# Patient Record
Sex: MALE | Race: WHITE
[De-identification: names, ages, dates, MRNs, and addresses within clinical notes are randomized per-mention and may not be internally consistent; named-entity substitution may affect disease eponyms.]

---

## 2019-06-29 ENCOUNTER — HOSPITAL ENCOUNTER (EMERGENCY)
Dept: HOSPITAL 95 - ER | Age: 44
Discharge: LEFT BEFORE DISCHARGE | End: 2019-06-29
Payer: COMMERCIAL

## 2019-06-29 VITALS — WEIGHT: 250 LBS | BODY MASS INDEX: 33.86 KG/M2 | HEIGHT: 72 IN

## 2019-06-29 DIAGNOSIS — R44.0: Primary | ICD-10-CM

## 2019-06-29 DIAGNOSIS — Z53.20: ICD-10-CM

## 2019-06-29 LAB
ALBUMIN SERPL BCP-MCNC: 3.9 G/DL (ref 3.4–5)
ALBUMIN/GLOB SERPL: 1.1 {RATIO} (ref 0.8–1.8)
ALT SERPL W P-5'-P-CCNC: 42 U/L (ref 12–78)
ANION GAP SERPL CALCULATED.4IONS-SCNC: 9 MMOL/L (ref 6–16)
APAP SERPL-MCNC: <2 UG/ML (ref 10–30)
AST SERPL W P-5'-P-CCNC: 28 U/L (ref 12–37)
BASOPHILS # BLD AUTO: 0.03 K/MM3 (ref 0–0.23)
BASOPHILS NFR BLD AUTO: 0 % (ref 0–2)
BILIRUB SERPL-MCNC: 0.3 MG/DL (ref 0.1–1)
BUN SERPL-MCNC: 18 MG/DL (ref 8–24)
CALCIUM SERPL-MCNC: 8.6 MG/DL (ref 8.5–10.1)
CHLORIDE SERPL-SCNC: 109 MMOL/L (ref 98–108)
CO2 SERPL-SCNC: 26 MMOL/L (ref 21–32)
CREAT SERPL-MCNC: 0.93 MG/DL (ref 0.6–1.2)
DEPRECATED RDW RBC AUTO: 45.1 FL (ref 35.1–46.3)
EOSINOPHIL # BLD AUTO: 0.2 K/MM3 (ref 0–0.68)
EOSINOPHIL NFR BLD AUTO: 2 % (ref 0–6)
ERYTHROCYTE [DISTWIDTH] IN BLOOD BY AUTOMATED COUNT: 13.6 % (ref 11.7–14.2)
ETHANOL SERPL-MCNC: <3 MG/DL
GLOBULIN SER CALC-MCNC: 3.5 G/DL (ref 2.2–4)
GLUCOSE SERPL-MCNC: 114 MG/DL (ref 70–99)
HCT VFR BLD AUTO: 43.9 % (ref 37–53)
HGB BLD-MCNC: 14.7 G/DL (ref 13.5–17.5)
IMM GRANULOCYTES # BLD AUTO: 0.04 K/MM3 (ref 0–0.1)
IMM GRANULOCYTES NFR BLD AUTO: 0 % (ref 0–1)
LYMPHOCYTES # BLD AUTO: 3.35 K/MM3 (ref 0.84–5.2)
LYMPHOCYTES NFR BLD AUTO: 32 % (ref 21–46)
MCHC RBC AUTO-ENTMCNC: 33.5 G/DL (ref 31.5–36.5)
MCV RBC AUTO: 90 FL (ref 80–100)
MONOCYTES # BLD AUTO: 0.81 K/MM3 (ref 0.16–1.47)
MONOCYTES NFR BLD AUTO: 8 % (ref 4–13)
NEUTROPHILS # BLD AUTO: 5.96 K/MM3 (ref 1.96–9.15)
NEUTROPHILS NFR BLD AUTO: 57 % (ref 41–73)
NRBC # BLD AUTO: 0 K/MM3 (ref 0–0.02)
NRBC BLD AUTO-RTO: 0 /100 WBC (ref 0–0.2)
PLATELET # BLD AUTO: 273 K/MM3 (ref 150–400)
POTASSIUM SERPL-SCNC: 3.6 MMOL/L (ref 3.5–5.5)
PROT SERPL-MCNC: 7.4 G/DL (ref 6.4–8.2)
SALICYLATES SERPL-MCNC: <1.7 MG/DL (ref 2.8–20)
SODIUM SERPL-SCNC: 144 MMOL/L (ref 136–145)
TSH SERPL DL<=0.005 MIU/L-ACNC: 3.83 UIU/ML (ref 0.36–4.8)

## 2019-06-29 PROCEDURE — G0480 DRUG TEST DEF 1-7 CLASSES: HCPCS

## 2020-03-07 ENCOUNTER — HOSPITAL ENCOUNTER (EMERGENCY)
Dept: HOSPITAL 95 - ER | Age: 45
Discharge: HOME | End: 2020-03-07
Payer: COMMERCIAL

## 2020-03-07 VITALS — BODY MASS INDEX: 35.89 KG/M2 | WEIGHT: 265 LBS | HEIGHT: 72 IN

## 2020-03-07 DIAGNOSIS — M79.674: Primary | ICD-10-CM

## 2020-03-07 DIAGNOSIS — Z88.8: ICD-10-CM

## 2020-03-07 DIAGNOSIS — Z79.899: ICD-10-CM

## 2020-03-07 DIAGNOSIS — F25.9: ICD-10-CM

## 2020-03-07 DIAGNOSIS — E78.5: ICD-10-CM

## 2020-03-07 DIAGNOSIS — Z87.891: ICD-10-CM

## 2020-05-31 ENCOUNTER — HOSPITAL ENCOUNTER (OUTPATIENT)
Dept: HOSPITAL 95 - ER | Age: 45
Setting detail: OBSERVATION
LOS: 1 days | Discharge: HOME | End: 2020-06-01
Attending: HOSPITALIST | Admitting: HOSPITALIST
Payer: COMMERCIAL

## 2020-05-31 VITALS — BODY MASS INDEX: 35.68 KG/M2 | HEIGHT: 71 IN | WEIGHT: 254.85 LBS

## 2020-05-31 DIAGNOSIS — I21.4: Primary | ICD-10-CM

## 2020-05-31 DIAGNOSIS — Z79.899: ICD-10-CM

## 2020-05-31 DIAGNOSIS — E66.9: ICD-10-CM

## 2020-05-31 DIAGNOSIS — F25.9: ICD-10-CM

## 2020-05-31 DIAGNOSIS — Z88.8: ICD-10-CM

## 2020-05-31 DIAGNOSIS — E78.5: ICD-10-CM

## 2020-05-31 LAB
ALBUMIN SERPL BCP-MCNC: 3.7 G/DL (ref 3.4–5)
ALBUMIN/GLOB SERPL: 1 {RATIO} (ref 0.8–1.8)
ALT SERPL W P-5'-P-CCNC: 39 U/L (ref 12–78)
ANION GAP SERPL CALCULATED.4IONS-SCNC: 7 MMOL/L (ref 6–16)
AST SERPL W P-5'-P-CCNC: 30 U/L (ref 12–37)
BASOPHILS # BLD AUTO: 0.04 K/MM3 (ref 0–0.23)
BASOPHILS NFR BLD AUTO: 1 % (ref 0–2)
BILIRUB SERPL-MCNC: 0.6 MG/DL (ref 0.1–1)
BUN SERPL-MCNC: 9 MG/DL (ref 8–24)
CALCIUM SERPL-MCNC: 8.1 MG/DL (ref 8.5–10.1)
CHLORIDE SERPL-SCNC: 110 MMOL/L (ref 98–108)
CHOLEST SERPL-MCNC: 122 MG/DL (ref 50–200)
CHOLEST/HDLC SERPL: 3 {RATIO}
CO2 SERPL-SCNC: 24 MMOL/L (ref 21–32)
CREAT SERPL-MCNC: 0.99 MG/DL (ref 0.6–1.2)
DEPRECATED RDW RBC AUTO: 45.1 FL (ref 35.1–46.3)
EOSINOPHIL # BLD AUTO: 0.19 K/MM3 (ref 0–0.68)
EOSINOPHIL NFR BLD AUTO: 2 % (ref 0–6)
ERYTHROCYTE [DISTWIDTH] IN BLOOD BY AUTOMATED COUNT: 13.6 % (ref 11.7–14.2)
GLOBULIN SER CALC-MCNC: 3.6 G/DL (ref 2.2–4)
GLUCOSE SERPL-MCNC: 99 MG/DL (ref 70–99)
HCT VFR BLD AUTO: 45.5 % (ref 37–53)
HDLC SERPL-MCNC: 41 MG/DL (ref 39–?)
HGB BLD-MCNC: 14.8 G/DL (ref 13.5–17.5)
IMM GRANULOCYTES # BLD AUTO: 0.02 K/MM3 (ref 0–0.1)
IMM GRANULOCYTES NFR BLD AUTO: 0 % (ref 0–1)
LDLC SERPL CALC-MCNC: 54 MG/DL (ref 0–110)
LDLC/HDLC SERPL: 1.3 {RATIO}
LYMPHOCYTES # BLD AUTO: 2.54 K/MM3 (ref 0.84–5.2)
LYMPHOCYTES NFR BLD AUTO: 30 % (ref 21–46)
MCHC RBC AUTO-ENTMCNC: 32.5 G/DL (ref 31.5–36.5)
MCV RBC AUTO: 91 FL (ref 80–100)
MONOCYTES # BLD AUTO: 0.61 K/MM3 (ref 0.16–1.47)
MONOCYTES NFR BLD AUTO: 7 % (ref 4–13)
NEUTROPHILS # BLD AUTO: 5.18 K/MM3 (ref 1.96–9.15)
NEUTROPHILS NFR BLD AUTO: 60 % (ref 41–73)
NRBC # BLD AUTO: 0 K/MM3 (ref 0–0.02)
NRBC BLD AUTO-RTO: 0 /100 WBC (ref 0–0.2)
PLATELET # BLD AUTO: 291 K/MM3 (ref 150–400)
POTASSIUM SERPL-SCNC: 4.4 MMOL/L (ref 3.5–5.5)
PROT SERPL-MCNC: 7.3 G/DL (ref 6.4–8.2)
SODIUM SERPL-SCNC: 141 MMOL/L (ref 136–145)
TRIGL SERPL-MCNC: 134 MG/DL (ref 30–160)
TROPONIN I SERPL-MCNC: 0.16 NG/ML (ref 0–0.04)
VLDLC SERPL CALC-MCNC: 26 MG/DL (ref 6–32)

## 2020-05-31 PROCEDURE — C1894 INTRO/SHEATH, NON-LASER: HCPCS

## 2020-05-31 PROCEDURE — C8923 2D TTE W OR W/O FOL W/CON,CO: HCPCS

## 2020-05-31 PROCEDURE — C1769 GUIDE WIRE: HCPCS

## 2020-05-31 NOTE — NUR
PT AOX4 AND COOPERATIVE OF CARE. PT IS INDEPENDENT IN ROOM. PT DENIES ANY
PAIN,CHEST PRESSURE OR SOB AT THIS TIME. HEPRIN DRIP CONTINUES AND CONSULT HAS
BEEN CALLED INTO CARDIOLIGIST. NO DISTRESS NOTED PT CALLS APPROPRIATELY. PT IS
INDEPENDENT IN ROOM. WILL CONTINUE TO MONITOR.

## 2020-06-01 LAB
ALBUMIN SERPL BCP-MCNC: 3.8 G/DL (ref 3.4–5)
ALBUMIN/GLOB SERPL: 1.1 {RATIO} (ref 0.8–1.8)
ALT SERPL W P-5'-P-CCNC: 38 U/L (ref 12–78)
ANION GAP SERPL CALCULATED.4IONS-SCNC: 9 MMOL/L (ref 6–16)
AST SERPL W P-5'-P-CCNC: 22 U/L (ref 12–37)
BASOPHILS # BLD AUTO: 0.05 K/MM3 (ref 0–0.23)
BASOPHILS NFR BLD AUTO: 1 % (ref 0–2)
BILIRUB SERPL-MCNC: 0.5 MG/DL (ref 0.1–1)
BUN SERPL-MCNC: 10 MG/DL (ref 8–24)
CALCIUM SERPL-MCNC: 8.4 MG/DL (ref 8.5–10.1)
CHLORIDE SERPL-SCNC: 107 MMOL/L (ref 98–108)
CO2 SERPL-SCNC: 26 MMOL/L (ref 21–32)
CREAT SERPL-MCNC: 0.93 MG/DL (ref 0.6–1.2)
DEPRECATED RDW RBC AUTO: 46.2 FL (ref 35.1–46.3)
EOSINOPHIL # BLD AUTO: 0.2 K/MM3 (ref 0–0.68)
EOSINOPHIL NFR BLD AUTO: 2 % (ref 0–6)
ERYTHROCYTE [DISTWIDTH] IN BLOOD BY AUTOMATED COUNT: 13.8 % (ref 11.7–14.2)
GLOBULIN SER CALC-MCNC: 3.6 G/DL (ref 2.2–4)
GLUCOSE SERPL-MCNC: 86 MG/DL (ref 70–99)
HCT VFR BLD AUTO: 47 % (ref 37–53)
HGB BLD-MCNC: 15 G/DL (ref 13.5–17.5)
IMM GRANULOCYTES # BLD AUTO: 0.02 K/MM3 (ref 0–0.1)
IMM GRANULOCYTES NFR BLD AUTO: 0 % (ref 0–1)
LYMPHOCYTES # BLD AUTO: 3.84 K/MM3 (ref 0.84–5.2)
LYMPHOCYTES NFR BLD AUTO: 39 % (ref 21–46)
MAGNESIUM SERPL-MCNC: 2.5 MG/DL (ref 1.6–2.4)
MCHC RBC AUTO-ENTMCNC: 31.9 G/DL (ref 31.5–36.5)
MCV RBC AUTO: 90 FL (ref 80–100)
MONOCYTES # BLD AUTO: 0.68 K/MM3 (ref 0.16–1.47)
MONOCYTES NFR BLD AUTO: 7 % (ref 4–13)
NEUTROPHILS # BLD AUTO: 5.07 K/MM3 (ref 1.96–9.15)
NEUTROPHILS NFR BLD AUTO: 52 % (ref 41–73)
NRBC # BLD AUTO: 0 K/MM3 (ref 0–0.02)
NRBC BLD AUTO-RTO: 0 /100 WBC (ref 0–0.2)
PLATELET # BLD AUTO: 308 K/MM3 (ref 150–400)
POTASSIUM SERPL-SCNC: 3.5 MMOL/L (ref 3.5–5.5)
PROT SERPL-MCNC: 7.4 G/DL (ref 6.4–8.2)
SODIUM SERPL-SCNC: 142 MMOL/L (ref 136–145)
TROPONIN I SERPL-MCNC: 0.17 NG/ML (ref 0–0.04)

## 2020-06-01 NOTE — NUR
2ml AIR REMOVED FROM RIGHT TR BAND, NO ACTIVE BLEEDING, WILL CONTINUE TO
MONITOR. CAP REFILL <3, RADIAL PULSE PALBABLE. CONTINUOUS OXIMITER READING
97%.

## 2020-06-01 NOTE — NUR
TR BAND REMAINS IN PLACE ON RIGHT WRIST. CAP REFILL <3, POSITIVE COLOR
CHANGES, RADIAL PULSE PALPABLE, NO BLEEDING AT THIS TIME.

## 2020-06-01 NOTE — NUR
PT TRANSPORTED TO THE [facility] FOR SCHEDULED ANGIO. HE HAS BEEN NPO SINCE
LAST NIGHT. HE IS A/O X 4 AND HAS NO COMPLAINTS THIS MORNING.
REPORT WAS CALLED TO PCU NURSE AS HE WILL BE TRANSFERRING TO PCU
AFTER HIS PROCEDURE. UPDATES HAVE BEEN GIVEN TO HIS MOM. BELONGINGS WILL BE
SENT TO HIS NEW ROOM.

## 2020-06-01 NOTE — NUR
RIGHT TR BAND REMOVED, TEGADERM PLACED, NO ACTIVE BLEEDING. DISCHARGE
INSTRUCTIONS REVIEWED WITH PT WITH CLEAR UNDERSTANDING.

## 2020-06-01 NOTE — NUR
ASSUMED CARE IN PCU ROOM 7, REPORT FROM RUBIA NETTLES IN [facility]ART [facility]. TR BAND IN
PLACE TO RIGHT WRIST. RADIAL PULSE PALPABLE, CAP REFILL <3, DENIES NUMBNESS,
ARM BOARD IN PLACE, NO BLEEDING. A/A/OX4, VSS, WILL CONTINUE TO MONITOR.

## 2020-06-01 NOTE — NUR
TOTAL AIR OF 12ML REMOVED OVER THE LAST HOUR. RIGHT TR BAND IN PLACE, NO
ACTIVE BLEEDING AT THIS TIME. RADIAL PULSE PALPABLE, CAP REFILL <3, POSITIVE
CAP REFILL, DENIES NUMBNESS TO FINGERS. WILL CONTINUE TO MONITOR.

## 2022-05-30 ENCOUNTER — HOSPITAL ENCOUNTER (EMERGENCY)
Dept: HOSPITAL 95 - ER | Age: 47
Discharge: HOME | End: 2022-05-30
Payer: COMMERCIAL

## 2022-05-30 VITALS — HEIGHT: 72 IN | BODY MASS INDEX: 27.09 KG/M2 | WEIGHT: 200 LBS

## 2022-05-30 DIAGNOSIS — Z87.891: ICD-10-CM

## 2022-05-30 DIAGNOSIS — Z79.82: ICD-10-CM

## 2022-05-30 DIAGNOSIS — Z79.899: ICD-10-CM

## 2022-05-30 DIAGNOSIS — Z88.8: ICD-10-CM

## 2022-05-30 DIAGNOSIS — G47.00: Primary | ICD-10-CM

## 2022-05-30 DIAGNOSIS — E78.5: ICD-10-CM

## 2022-05-30 PROCEDURE — A9270 NON-COVERED ITEM OR SERVICE: HCPCS

## 2022-06-27 ENCOUNTER — HOSPITAL ENCOUNTER (EMERGENCY)
Dept: HOSPITAL 95 - ER | Age: 47
Discharge: HOME | End: 2022-06-27
Payer: COMMERCIAL

## 2022-06-27 VITALS — HEIGHT: 72 IN | BODY MASS INDEX: 27.09 KG/M2 | WEIGHT: 200 LBS

## 2022-06-27 DIAGNOSIS — M54.50: Primary | ICD-10-CM

## 2022-06-27 DIAGNOSIS — E78.5: ICD-10-CM

## 2022-06-27 DIAGNOSIS — F25.9: ICD-10-CM

## 2022-06-27 DIAGNOSIS — Z79.899: ICD-10-CM

## 2022-06-27 DIAGNOSIS — Z88.8: ICD-10-CM

## 2022-06-27 PROCEDURE — A9270 NON-COVERED ITEM OR SERVICE: HCPCS

## 2022-07-09 ENCOUNTER — HOSPITAL ENCOUNTER (EMERGENCY)
Dept: HOSPITAL 95 - ER | Age: 47
Discharge: HOME | End: 2022-07-09
Payer: COMMERCIAL

## 2022-07-09 VITALS — WEIGHT: 200 LBS | BODY MASS INDEX: 27.09 KG/M2 | HEIGHT: 72 IN

## 2022-07-09 DIAGNOSIS — Z79.82: ICD-10-CM

## 2022-07-09 DIAGNOSIS — Z79.899: ICD-10-CM

## 2022-07-09 DIAGNOSIS — F25.9: Primary | ICD-10-CM

## 2022-07-09 DIAGNOSIS — Z87.891: ICD-10-CM

## 2022-07-14 ENCOUNTER — HOSPITAL ENCOUNTER (OUTPATIENT)
Dept: HOSPITAL 95 - ORSCSDS | Age: 47
Discharge: HOME | End: 2022-07-14
Attending: STUDENT IN AN ORGANIZED HEALTH CARE EDUCATION/TRAINING PROGRAM
Payer: COMMERCIAL

## 2022-07-14 VITALS — WEIGHT: 203.93 LBS | HEIGHT: 72 IN | BODY MASS INDEX: 27.62 KG/M2

## 2022-07-14 DIAGNOSIS — Z12.11: Primary | ICD-10-CM

## 2022-07-14 DIAGNOSIS — D12.5: ICD-10-CM

## 2022-07-14 DIAGNOSIS — D12.4: ICD-10-CM

## 2022-07-14 DIAGNOSIS — D12.0: ICD-10-CM

## 2022-07-14 DIAGNOSIS — Z79.82: ICD-10-CM

## 2022-07-14 DIAGNOSIS — I25.10: ICD-10-CM

## 2022-07-14 DIAGNOSIS — Z79.899: ICD-10-CM

## 2022-07-14 DIAGNOSIS — F20.9: ICD-10-CM

## 2022-07-14 DIAGNOSIS — E78.5: ICD-10-CM

## 2022-07-14 DIAGNOSIS — Z87.891: ICD-10-CM

## 2022-07-14 DIAGNOSIS — Z83.71: ICD-10-CM

## 2022-07-14 PROCEDURE — 0DBM8ZX EXCISION OF DESCENDING COLON, VIA NATURAL OR ARTIFICIAL OPENING ENDOSCOPIC, DIAGNOSTIC: ICD-10-PCS | Performed by: STUDENT IN AN ORGANIZED HEALTH CARE EDUCATION/TRAINING PROGRAM

## 2022-07-14 PROCEDURE — 0DBH8ZX EXCISION OF CECUM, VIA NATURAL OR ARTIFICIAL OPENING ENDOSCOPIC, DIAGNOSTIC: ICD-10-PCS | Performed by: STUDENT IN AN ORGANIZED HEALTH CARE EDUCATION/TRAINING PROGRAM

## 2022-07-22 ENCOUNTER — HOSPITAL ENCOUNTER (EMERGENCY)
Dept: HOSPITAL 95 - ER | Age: 47
Discharge: HOME | End: 2022-07-22
Payer: COMMERCIAL

## 2022-07-22 VITALS — HEIGHT: 72 IN | BODY MASS INDEX: 27.09 KG/M2 | WEIGHT: 200 LBS

## 2022-07-22 DIAGNOSIS — F25.9: Primary | ICD-10-CM

## 2023-03-02 ENCOUNTER — HOSPITAL ENCOUNTER (EMERGENCY)
Dept: HOSPITAL 95 - ER | Age: 48
Discharge: HOME | End: 2023-03-02
Payer: COMMERCIAL

## 2023-03-02 VITALS — BODY MASS INDEX: 24.52 KG/M2 | HEIGHT: 73 IN | WEIGHT: 184.99 LBS

## 2023-03-02 DIAGNOSIS — N48.89: ICD-10-CM

## 2023-03-02 DIAGNOSIS — Z79.82: ICD-10-CM

## 2023-03-02 DIAGNOSIS — Z88.8: ICD-10-CM

## 2023-03-02 DIAGNOSIS — E78.5: ICD-10-CM

## 2023-03-02 DIAGNOSIS — Z79.899: ICD-10-CM

## 2023-03-02 DIAGNOSIS — H92.09: ICD-10-CM

## 2023-03-02 DIAGNOSIS — F20.9: Primary | ICD-10-CM

## 2023-03-02 LAB
SP GR SPEC: 1.01 (ref 1–1.02)
UROBILINOGEN UR STRIP-MCNC: (no result) MG/DL

## 2023-03-14 ENCOUNTER — HOSPITAL ENCOUNTER (EMERGENCY)
Dept: HOSPITAL 95 - ER | Age: 48
LOS: 1 days | Discharge: HOME | End: 2023-03-15
Payer: COMMERCIAL

## 2023-03-14 VITALS — WEIGHT: 195 LBS | HEIGHT: 72 IN | BODY MASS INDEX: 26.41 KG/M2

## 2023-03-14 DIAGNOSIS — G47.00: Primary | ICD-10-CM

## 2023-03-14 DIAGNOSIS — Z88.8: ICD-10-CM

## 2023-03-14 DIAGNOSIS — Z79.82: ICD-10-CM

## 2023-03-14 DIAGNOSIS — Z79.899: ICD-10-CM

## 2023-03-14 DIAGNOSIS — I25.2: ICD-10-CM

## 2023-03-14 DIAGNOSIS — E78.5: ICD-10-CM

## 2023-04-21 ENCOUNTER — HOSPITAL ENCOUNTER (OUTPATIENT)
Dept: HOSPITAL 95 - ER | Age: 48
Setting detail: OBSERVATION
LOS: 1 days | Discharge: HOME | End: 2023-04-22
Attending: EMERGENCY MEDICINE | Admitting: EMERGENCY MEDICINE
Payer: COMMERCIAL

## 2023-04-21 ENCOUNTER — HOSPITAL ENCOUNTER (EMERGENCY)
Dept: HOSPITAL 95 - ER | Age: 48
Discharge: HOME | End: 2023-04-21
Payer: COMMERCIAL

## 2023-04-21 VITALS — SYSTOLIC BLOOD PRESSURE: 116 MMHG | DIASTOLIC BLOOD PRESSURE: 79 MMHG

## 2023-04-21 VITALS — BODY MASS INDEX: 28.44 KG/M2 | HEIGHT: 72 IN | WEIGHT: 209.99 LBS

## 2023-04-21 VITALS — BODY MASS INDEX: 26.51 KG/M2 | WEIGHT: 200 LBS | HEIGHT: 73 IN

## 2023-04-21 DIAGNOSIS — Z88.8: ICD-10-CM

## 2023-04-21 DIAGNOSIS — E78.5: ICD-10-CM

## 2023-04-21 DIAGNOSIS — Z20.822: ICD-10-CM

## 2023-04-21 DIAGNOSIS — F25.9: ICD-10-CM

## 2023-04-21 DIAGNOSIS — F25.9: Primary | ICD-10-CM

## 2023-04-21 DIAGNOSIS — Z79.899: ICD-10-CM

## 2023-04-21 DIAGNOSIS — I25.2: ICD-10-CM

## 2023-04-21 DIAGNOSIS — F23: Primary | ICD-10-CM

## 2023-04-21 DIAGNOSIS — M10.9: ICD-10-CM

## 2023-04-21 LAB
ALBUMIN SERPL BCP-MCNC: 3.8 G/DL (ref 3.4–5)
ALBUMIN/GLOB SERPL: 1.1 {RATIO} (ref 0.8–1.8)
ALT SERPL W P-5'-P-CCNC: 58 U/L (ref 12–78)
ANION GAP SERPL CALCULATED.4IONS-SCNC: 2 MMOL/L (ref 6–16)
APAP SERPL-MCNC: <2 UG/ML (ref 10–30)
AST SERPL W P-5'-P-CCNC: 39 U/L (ref 12–37)
BASOPHILS # BLD AUTO: 0.03 K/MM3 (ref 0–0.23)
BASOPHILS NFR BLD AUTO: 0 % (ref 0–2)
BILIRUB SERPL-MCNC: 0.5 MG/DL (ref 0.1–1)
BUN SERPL-MCNC: 33 MG/DL (ref 8–24)
CALCIUM SERPL-MCNC: 8.7 MG/DL (ref 8.5–10.1)
CHLORIDE SERPL-SCNC: 105 MMOL/L (ref 98–108)
CO2 SERPL-SCNC: 27 MMOL/L (ref 21–32)
CREAT SERPL-MCNC: 1.5 MG/DL (ref 0.6–1.2)
DEPRECATED RDW RBC AUTO: 43.8 FL (ref 35.1–46.3)
EOSINOPHIL # BLD AUTO: 0.1 K/MM3 (ref 0–0.68)
EOSINOPHIL NFR BLD AUTO: 1 % (ref 0–6)
ERYTHROCYTE [DISTWIDTH] IN BLOOD BY AUTOMATED COUNT: 13.2 % (ref 11.7–14.2)
ETHANOL SERPL-MCNC: <3 MG/DL
FLUAV RNA SPEC QL NAA+PROBE: NEGATIVE
FLUBV RNA SPEC QL NAA+PROBE: NEGATIVE
GLOBULIN SER CALC-MCNC: 3.6 G/DL (ref 2.2–4)
GLUCOSE SERPL-MCNC: 95 MG/DL (ref 70–99)
HCT VFR BLD AUTO: 44.6 % (ref 37–53)
HGB BLD-MCNC: 14.9 G/DL (ref 13.5–17.5)
IMM GRANULOCYTES # BLD AUTO: 0.01 K/MM3 (ref 0–0.1)
IMM GRANULOCYTES NFR BLD AUTO: 0 % (ref 0–1)
LYMPHOCYTES # BLD AUTO: 2.89 K/MM3 (ref 0.84–5.2)
LYMPHOCYTES NFR BLD AUTO: 31 % (ref 21–46)
MCHC RBC AUTO-ENTMCNC: 33.4 G/DL (ref 31.5–36.5)
MCV RBC AUTO: 89 FL (ref 80–100)
MONOCYTES # BLD AUTO: 0.84 K/MM3 (ref 0.16–1.47)
MONOCYTES NFR BLD AUTO: 9 % (ref 4–13)
NEUTROPHILS # BLD AUTO: 5.44 K/MM3 (ref 1.96–9.15)
NEUTROPHILS NFR BLD AUTO: 59 % (ref 41–73)
NRBC # BLD AUTO: 0 K/MM3 (ref 0–0.02)
NRBC BLD AUTO-RTO: 0 /100 WBC (ref 0–0.2)
PLATELET # BLD AUTO: 235 K/MM3 (ref 150–400)
POTASSIUM SERPL-SCNC: 4.4 MMOL/L (ref 3.5–5.5)
PROT SERPL-MCNC: 7.4 G/DL (ref 6.4–8.2)
RSV RNA SPEC QL NAA+PROBE: NEGATIVE
SALICYLATES SERPL-MCNC: <1.7 MG/DL (ref 2.8–20)
SARS-COV-2 RNA RESP QL NAA+PROBE: NEGATIVE
SODIUM SERPL-SCNC: 134 MMOL/L (ref 136–145)
SP GR SPEC: 1.01 (ref 1–1.02)
UROBILINOGEN UR STRIP-MCNC: (no result) MG/DL

## 2023-04-21 PROCEDURE — A9270 NON-COVERED ITEM OR SERVICE: HCPCS

## 2023-04-21 PROCEDURE — G0480 DRUG TEST DEF 1-7 CLASSES: HCPCS

## 2023-04-21 PROCEDURE — G0378 HOSPITAL OBSERVATION PER HR: HCPCS

## 2023-04-22 VITALS — DIASTOLIC BLOOD PRESSURE: 79 MMHG | SYSTOLIC BLOOD PRESSURE: 107 MMHG

## 2023-05-28 ENCOUNTER — HOSPITAL ENCOUNTER (EMERGENCY)
Dept: HOSPITAL 95 - ER | Age: 48
Discharge: HOME | End: 2023-05-28
Payer: COMMERCIAL

## 2023-05-28 VITALS — WEIGHT: 214.99 LBS | BODY MASS INDEX: 29.12 KG/M2 | HEIGHT: 72 IN

## 2023-05-28 VITALS — DIASTOLIC BLOOD PRESSURE: 69 MMHG | SYSTOLIC BLOOD PRESSURE: 118 MMHG

## 2023-05-28 DIAGNOSIS — F20.9: ICD-10-CM

## 2023-05-28 DIAGNOSIS — J02.9: Primary | ICD-10-CM

## 2023-05-28 DIAGNOSIS — E78.00: ICD-10-CM

## 2023-05-28 DIAGNOSIS — Z87.891: ICD-10-CM

## 2023-06-19 ENCOUNTER — HOSPITAL ENCOUNTER (EMERGENCY)
Dept: HOSPITAL 95 - ER | Age: 48
Discharge: HOME | End: 2023-06-19
Payer: COMMERCIAL

## 2023-06-19 VITALS — WEIGHT: 200 LBS | BODY MASS INDEX: 27.09 KG/M2 | HEIGHT: 72 IN

## 2023-06-19 VITALS — DIASTOLIC BLOOD PRESSURE: 86 MMHG | SYSTOLIC BLOOD PRESSURE: 110 MMHG

## 2023-06-19 DIAGNOSIS — F25.9: Primary | ICD-10-CM

## 2023-06-19 DIAGNOSIS — Z87.891: ICD-10-CM

## 2023-06-19 DIAGNOSIS — Z79.899: ICD-10-CM

## 2023-06-19 DIAGNOSIS — Z88.8: ICD-10-CM

## 2023-09-06 ENCOUNTER — HOSPITAL ENCOUNTER (EMERGENCY)
Dept: HOSPITAL 95 - ER | Age: 48
Discharge: HOME | End: 2023-09-06
Payer: COMMERCIAL

## 2023-09-06 VITALS — WEIGHT: 209.99 LBS | BODY MASS INDEX: 28.44 KG/M2 | HEIGHT: 72 IN

## 2023-09-06 VITALS — DIASTOLIC BLOOD PRESSURE: 77 MMHG | SYSTOLIC BLOOD PRESSURE: 115 MMHG

## 2023-09-06 DIAGNOSIS — F22: Primary | ICD-10-CM

## 2023-09-06 DIAGNOSIS — Z88.8: ICD-10-CM

## 2023-09-06 DIAGNOSIS — E78.5: ICD-10-CM

## 2023-09-06 DIAGNOSIS — Z79.899: ICD-10-CM

## 2023-09-06 DIAGNOSIS — I25.2: ICD-10-CM

## 2023-09-06 DIAGNOSIS — M10.9: ICD-10-CM

## 2023-09-07 ENCOUNTER — HOSPITAL ENCOUNTER (OUTPATIENT)
Dept: HOSPITAL 95 - ER | Age: 48
Setting detail: OBSERVATION
Discharge: HOME | End: 2023-09-07
Attending: STUDENT IN AN ORGANIZED HEALTH CARE EDUCATION/TRAINING PROGRAM | Admitting: STUDENT IN AN ORGANIZED HEALTH CARE EDUCATION/TRAINING PROGRAM
Payer: COMMERCIAL

## 2023-09-07 VITALS — BODY MASS INDEX: 29.12 KG/M2 | WEIGHT: 214.99 LBS | HEIGHT: 72 IN

## 2023-09-07 VITALS — SYSTOLIC BLOOD PRESSURE: 108 MMHG | DIASTOLIC BLOOD PRESSURE: 69 MMHG

## 2023-09-07 DIAGNOSIS — E78.5: ICD-10-CM

## 2023-09-07 DIAGNOSIS — M10.9: ICD-10-CM

## 2023-09-07 DIAGNOSIS — F20.9: Primary | ICD-10-CM

## 2023-09-07 DIAGNOSIS — I25.2: ICD-10-CM

## 2023-09-07 LAB
ALBUMIN SERPL BCP-MCNC: 3.8 G/DL (ref 3.4–5)
ALBUMIN/GLOB SERPL: 1.1 {RATIO} (ref 0.8–1.8)
ALT SERPL W P-5'-P-CCNC: 32 U/L (ref 12–78)
ANION GAP SERPL CALCULATED.4IONS-SCNC: 4 MMOL/L (ref 6–16)
APAP SERPL-MCNC: <2 UG/ML (ref 10–30)
AST SERPL W P-5'-P-CCNC: 17 U/L (ref 12–37)
BASOPHILS # BLD AUTO: 0.02 K/MM3 (ref 0–0.23)
BASOPHILS NFR BLD AUTO: 0 % (ref 0–2)
BILIRUB SERPL-MCNC: 0.5 MG/DL (ref 0.1–1)
BUN SERPL-MCNC: 17 MG/DL (ref 8–24)
CALCIUM SERPL-MCNC: 8.7 MG/DL (ref 8.5–10.1)
CHLORIDE SERPL-SCNC: 107 MMOL/L (ref 98–108)
CO2 SERPL-SCNC: 29 MMOL/L (ref 21–32)
CREAT SERPL-MCNC: 1.14 MG/DL (ref 0.6–1.2)
DEPRECATED RDW RBC AUTO: 40.9 FL (ref 35.1–46.3)
EOSINOPHIL # BLD AUTO: 0.21 K/MM3 (ref 0–0.68)
EOSINOPHIL NFR BLD AUTO: 3 % (ref 0–6)
ERYTHROCYTE [DISTWIDTH] IN BLOOD BY AUTOMATED COUNT: 12.5 % (ref 11.7–14.2)
ETHANOL SERPL-MCNC: <3 MG/DL
GLOBULIN SER CALC-MCNC: 3.5 G/DL (ref 2.2–4)
GLUCOSE SERPL-MCNC: 90 MG/DL (ref 70–99)
HCT VFR BLD AUTO: 45.7 % (ref 37–53)
HGB BLD-MCNC: 15.4 G/DL (ref 13.5–17.5)
IMM GRANULOCYTES # BLD AUTO: 0.02 K/MM3 (ref 0–0.1)
IMM GRANULOCYTES NFR BLD AUTO: 0 % (ref 0–1)
LYMPHOCYTES # BLD AUTO: 1.54 K/MM3 (ref 0.84–5.2)
LYMPHOCYTES NFR BLD AUTO: 20 % (ref 21–46)
MCHC RBC AUTO-ENTMCNC: 33.7 G/DL (ref 31.5–36.5)
MCV RBC AUTO: 89 FL (ref 80–100)
MONOCYTES # BLD AUTO: 0.62 K/MM3 (ref 0.16–1.47)
MONOCYTES NFR BLD AUTO: 8 % (ref 4–13)
NEUTROPHILS # BLD AUTO: 5.5 K/MM3 (ref 1.96–9.15)
NEUTROPHILS NFR BLD AUTO: 69 % (ref 41–73)
NRBC # BLD AUTO: 0 K/MM3 (ref 0–0.02)
NRBC BLD AUTO-RTO: 0 /100 WBC (ref 0–0.2)
PLATELET # BLD AUTO: 244 K/MM3 (ref 150–400)
POTASSIUM SERPL-SCNC: 4.2 MMOL/L (ref 3.5–5.5)
PROT SERPL-MCNC: 7.3 G/DL (ref 6.4–8.2)
SALICYLATES SERPL-MCNC: <1.7 MG/DL (ref 2.8–20)
SODIUM SERPL-SCNC: 140 MMOL/L (ref 136–145)
SP GR SPEC: 1.01 (ref 1–1.02)
UROBILINOGEN UR STRIP-MCNC: (no result) MG/DL

## 2023-09-07 PROCEDURE — G0378 HOSPITAL OBSERVATION PER HR: HCPCS

## 2023-09-07 PROCEDURE — G0480 DRUG TEST DEF 1-7 CLASSES: HCPCS

## 2024-09-25 ENCOUNTER — HOSPITAL ENCOUNTER (OUTPATIENT)
Dept: HOSPITAL 95 - ER | Age: 49
Setting detail: OBSERVATION
LOS: 1 days | Discharge: OTHER INSTITUTION | End: 2024-09-26
Attending: STUDENT IN AN ORGANIZED HEALTH CARE EDUCATION/TRAINING PROGRAM | Admitting: STUDENT IN AN ORGANIZED HEALTH CARE EDUCATION/TRAINING PROGRAM
Payer: COMMERCIAL

## 2024-09-25 VITALS — BODY MASS INDEX: 28.44 KG/M2 | WEIGHT: 209.99 LBS | HEIGHT: 72 IN

## 2024-09-25 DIAGNOSIS — Z88.8: ICD-10-CM

## 2024-09-25 DIAGNOSIS — I25.2: ICD-10-CM

## 2024-09-25 DIAGNOSIS — E78.5: ICD-10-CM

## 2024-09-25 DIAGNOSIS — Z79.899: ICD-10-CM

## 2024-09-25 DIAGNOSIS — F20.9: Primary | ICD-10-CM

## 2024-09-25 LAB
ALBUMIN SERPL BCP-MCNC: 3.8 G/DL (ref 3.4–5)
ALBUMIN/GLOB SERPL: 1.2 {RATIO} (ref 0.8–1.8)
ALT SERPL W P-5'-P-CCNC: 23 U/L (ref 12–78)
ANION GAP SERPL CALCULATED.4IONS-SCNC: 10 MMOL/L (ref 3–11)
APAP SERPL-MCNC: <2 UG/ML (ref 10–30)
AST SERPL W P-5'-P-CCNC: 20 U/L (ref 12–37)
BASOPHILS # BLD AUTO: 0.03 K/MM3 (ref 0–0.23)
BASOPHILS NFR BLD AUTO: 0 % (ref 0–2)
BILIRUB SERPL-MCNC: 0.5 MG/DL (ref 0.1–1)
BUN SERPL-MCNC: 20 MG/DL (ref 8–24)
CALCIUM SERPL-MCNC: 8.7 MG/DL (ref 8.5–10.1)
CHLORIDE SERPL-SCNC: 109 MMOL/L (ref 98–108)
CO2 SERPL-SCNC: 25 MMOL/L (ref 21–32)
CREAT SERPL-MCNC: 1.2 MG/DL (ref 0.6–1.2)
DEPRECATED RDW RBC AUTO: 43.9 FL (ref 35.1–46.3)
EOSINOPHIL # BLD AUTO: 0.18 K/MM3 (ref 0–0.68)
EOSINOPHIL NFR BLD AUTO: 2 % (ref 0–6)
ERYTHROCYTE [DISTWIDTH] IN BLOOD BY AUTOMATED COUNT: 13.3 % (ref 11.7–14.2)
ETHANOL SERPL-MCNC: <3 MG/DL
GLOBULIN SER CALC-MCNC: 3.3 G/DL (ref 2.2–4)
GLUCOSE SERPL-MCNC: 88 MG/DL (ref 70–99)
HCT VFR BLD AUTO: 42.9 % (ref 37–53)
HGB BLD-MCNC: 14.2 G/DL (ref 13.5–17.5)
IMM GRANULOCYTES # BLD AUTO: 0.01 K/MM3 (ref 0–0.1)
IMM GRANULOCYTES NFR BLD AUTO: 0 % (ref 0–1)
LYMPHOCYTES # BLD AUTO: 2.41 K/MM3 (ref 0.84–5.2)
LYMPHOCYTES NFR BLD AUTO: 29 % (ref 21–46)
MCHC RBC AUTO-ENTMCNC: 33.1 G/DL (ref 31.5–36.5)
MCV RBC AUTO: 89 FL (ref 80–100)
MONOCYTES # BLD AUTO: 0.79 K/MM3 (ref 0.16–1.47)
MONOCYTES NFR BLD AUTO: 10 % (ref 4–13)
NEUTROPHILS # BLD AUTO: 4.85 K/MM3 (ref 1.96–9.15)
NEUTROPHILS NFR BLD AUTO: 59 % (ref 41–73)
NRBC # BLD AUTO: 0 K/MM3 (ref 0–0.02)
NRBC BLD AUTO-RTO: 0 /100 WBC (ref 0–0.2)
PLATELET # BLD AUTO: 267 K/MM3 (ref 150–400)
POTASSIUM SERPL-SCNC: 4.1 MMOL/L (ref 3.5–5.5)
PROT SERPL-MCNC: 7.1 G/DL (ref 6.4–8.2)
SALICYLATES SERPL-MCNC: <1.7 MG/DL (ref 2.8–20)
SODIUM SERPL-SCNC: 140 MMOL/L (ref 136–145)
SP GR SPEC: 1.01 (ref 1–1.02)
UROBILINOGEN UR STRIP-MCNC: (no result) MG/DL

## 2024-09-25 PROCEDURE — G0480 DRUG TEST DEF 1-7 CLASSES: HCPCS

## 2024-09-25 PROCEDURE — G0378 HOSPITAL OBSERVATION PER HR: HCPCS

## 2024-09-25 PROCEDURE — A9270 NON-COVERED ITEM OR SERVICE: HCPCS

## 2024-09-26 ENCOUNTER — HOSPITAL ENCOUNTER (INPATIENT)
Dept: HOSPITAL 95 - BHU | Age: 49
LOS: 4 days | Discharge: HOME | DRG: 885 | End: 2024-09-30
Attending: PSYCHIATRY & NEUROLOGY | Admitting: PSYCHIATRY & NEUROLOGY
Payer: COMMERCIAL

## 2024-09-26 VITALS — DIASTOLIC BLOOD PRESSURE: 65 MMHG | SYSTOLIC BLOOD PRESSURE: 108 MMHG

## 2024-09-26 VITALS — DIASTOLIC BLOOD PRESSURE: 74 MMHG | SYSTOLIC BLOOD PRESSURE: 102 MMHG

## 2024-09-26 DIAGNOSIS — F20.0: Primary | ICD-10-CM

## 2024-09-26 DIAGNOSIS — I25.2: ICD-10-CM

## 2024-09-26 DIAGNOSIS — Z79.899: ICD-10-CM

## 2024-09-26 DIAGNOSIS — M10.9: ICD-10-CM

## 2024-09-26 DIAGNOSIS — E78.5: ICD-10-CM

## 2024-09-26 PROCEDURE — A9270 NON-COVERED ITEM OR SERVICE: HCPCS
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
## 2024-09-27 VITALS — DIASTOLIC BLOOD PRESSURE: 66 MMHG | SYSTOLIC BLOOD PRESSURE: 102 MMHG

## 2024-09-27 NOTE — NUR
Pt is A&O, calm, cooperative, eye contact is good. Pt stated that his mood is
"better," affect is somewhat constricted. Pt denies current SI, HI, and VH. He
endorses AH of "chatter." Pt denies current pain and other medical issues and
said that he slept "well" after moving into the seclusion room to sleep. Pt
showered this morning. Pt was active on the milieu today, watching TV, walking
around he common areas, and interacting with peers and staff. At about 1400 pt
c/o having a "cloudy" spell and was feeling a little groggy and sluggish "like
waking up at 2 in the morning." Provider changed his risperidone order from
2mg bid to 3mg qhs. Pt is med compliant, no PRNs given this shift.

## 2024-09-27 NOTE — NUR
Patient spent most of the evening pacing the hallway, stretching, and watching
a movie. He was cooperative with assessment. He took his scheduled medication
and PRN melatonin and trazodone without issue. He expressed concern that he
had not defecated today and requested a PRN to assist with him going but was
agreeable to speak with the provider about this issue in the morning. He also
agreed that he had gone yesterday without issue. He denied any symptoms when
asked but later admitted that he  had so many people in my head.  He is able
to make his needs known. Plan of care ongoing.
 
After resting in his room for 30 minutes, he requested to sleep in the
seclusion room due to voices in his head getting bad and that he did not want
to disturb other people if he was up during the night.
 
He appeared to be sleeping for 6 hours.

## 2024-09-28 VITALS — SYSTOLIC BLOOD PRESSURE: 114 MMHG | DIASTOLIC BLOOD PRESSURE: 70 MMHG

## 2024-09-28 VITALS — SYSTOLIC BLOOD PRESSURE: 110 MMHG | DIASTOLIC BLOOD PRESSURE: 63 MMHG

## 2024-09-28 NOTE — NUR
Pt is A&O, calm, cooperative, eye contact is good. Pt presents with euthymic
mood, "good," and constricted affect. Pt denies SI, HI, and VH. He continues
to endorse AH, but wouldn't describe the nature of the hallucinations. Pt
denies pain and said that he had a BM today. Pt endorses some delusional
thoughts, stating, "I got ghosted by the beople who turn off and on my brain."
Pt's risperidone has been increased to 4mg HS and he has a new order for PRN
zolpidem 10mg for sleep. Pt was active on the unit throughout the day,
watching TV, making phone calls, and interacting with peers and staff.

## 2024-09-28 NOTE — NUR
Patient spent most of the evening either watching TV or pacing in the hallway.
He was cooperative with assessment. He took his scheduled medication and PRN
trazodone and melatonin without issue. He denied any concerns or symptoms at
this time. He is able to make his need known. Plan of care ongoing.
 
He appeared to be sleeping for 8 hours.

## 2024-09-29 VITALS — DIASTOLIC BLOOD PRESSURE: 70 MMHG | SYSTOLIC BLOOD PRESSURE: 109 MMHG

## 2024-09-29 VITALS — SYSTOLIC BLOOD PRESSURE: 107 MMHG | DIASTOLIC BLOOD PRESSURE: 71 MMHG

## 2024-09-29 NOTE — NUR
SHIFT SUMMARY
 
PT AA&OX4. PLEASANT AND COOPERATIVE WITH CARE. DENIES SI. REPORTS AH. STATES
THAT THIS IS NORMAL AND HE FEELS IN CONTROL. SPEECH AND EYE CONTACT
APPROPRIATE. ANXIETY WELL CONTROLLED. PT HAS BEEN UP WITH [facility]. HE HAS BEEN
PRACTICING YOGA. APPETITE IS GOOD. COMPLIANT WITH ALL MEDICATIONS. HE HAS NO
CURRENT NEEDS OR CONCERNS. WILL CONTINUE POC

## 2024-09-29 NOTE — NUR
Patient spent most of the evening watching TV and stretching/exercising in his
room. He was cooperative with assessment. He was in a good mood because he
ate so much today.  He took his scheduled medication and PRN melatonin,
trazodone, and ambien without issue. He denied any new concerns at this time.
He denied symptoms at this time. He is able to make his needs known. Plan of
care ongoing.
 
He appeared to be sleeping for 8.5 hours.

## 2024-09-30 VITALS — DIASTOLIC BLOOD PRESSURE: 75 MMHG | SYSTOLIC BLOOD PRESSURE: 116 MMHG

## 2024-09-30 NOTE — NUR
Patient spent most of the evening pacing the hallway or watching TV. He was
cooperative with assessment. He took his scheduled medication and PRN
melatonin, trazodone, and zolpidem without issue. He denied new concerns at
this time. He denied symptoms at this time but was observed to be responding
to internal stimuli at times. He is able to make his needs known. Plan of care
ongoing.
 
He appeared to be sleeping in her room for 8.5 hours.

## 2024-09-30 NOTE — NUR
PT DENIED SI, HI VH AND PAIN. HE ENDORSED ANXIETY AND RATED IT AT 6/10w AND HE
ALSO ENDORSED HEARING VOICES, "THEY ARE JUST IN THE BACKGROUND AS USUAL." PT
WAS DISCHARGED AT 12:52 WITH HIS PRINTED INSTRUCTIONS FOR MEDICATIONS AND
APPOINTMENTS AND ALL OF HIS BELONGINGS.

## 2024-10-13 ENCOUNTER — HOSPITAL ENCOUNTER (OUTPATIENT)
Dept: HOSPITAL 95 - ER | Age: 49
Setting detail: OBSERVATION
LOS: 1 days | Discharge: HOME | End: 2024-10-14
Attending: EMERGENCY MEDICINE | Admitting: EMERGENCY MEDICINE
Payer: COMMERCIAL

## 2024-10-13 VITALS — BODY MASS INDEX: 28.44 KG/M2 | WEIGHT: 209.99 LBS | HEIGHT: 72 IN

## 2024-10-13 DIAGNOSIS — Z88.8: ICD-10-CM

## 2024-10-13 DIAGNOSIS — I25.2: ICD-10-CM

## 2024-10-13 DIAGNOSIS — E78.5: ICD-10-CM

## 2024-10-13 DIAGNOSIS — Z79.899: ICD-10-CM

## 2024-10-13 DIAGNOSIS — F20.0: Primary | ICD-10-CM

## 2024-10-13 LAB
ALBUMIN SERPL BCP-MCNC: 3.9 G/DL (ref 3.4–5)
ALBUMIN/GLOB SERPL: 1.1 {RATIO} (ref 0.8–1.8)
ALT SERPL W P-5'-P-CCNC: 27 U/L (ref 12–78)
ANION GAP SERPL CALCULATED.4IONS-SCNC: 6 MMOL/L (ref 3–11)
APAP SERPL-MCNC: <2 UG/ML (ref 10–30)
AST SERPL W P-5'-P-CCNC: 25 U/L (ref 12–37)
BASOPHILS # BLD AUTO: 0.03 K/MM3 (ref 0–0.23)
BASOPHILS NFR BLD AUTO: 0 % (ref 0–2)
BILIRUB SERPL-MCNC: 0.4 MG/DL (ref 0.1–1)
BUN SERPL-MCNC: 21 MG/DL (ref 8–24)
CALCIUM SERPL-MCNC: 8.9 MG/DL (ref 8.5–10.1)
CHLORIDE SERPL-SCNC: 106 MMOL/L (ref 98–108)
CO2 SERPL-SCNC: 30 MMOL/L (ref 21–32)
CREAT SERPL-MCNC: 1.38 MG/DL (ref 0.6–1.2)
DEPRECATED RDW RBC AUTO: 42.9 FL (ref 35.1–46.3)
EOSINOPHIL # BLD AUTO: 0.25 K/MM3 (ref 0–0.68)
EOSINOPHIL NFR BLD AUTO: 3 % (ref 0–6)
ERYTHROCYTE [DISTWIDTH] IN BLOOD BY AUTOMATED COUNT: 13.2 % (ref 11.7–14.2)
ETHANOL SERPL-MCNC: <3 MG/DL
FLUAV RNA SPEC QL NAA+PROBE: NEGATIVE
FLUBV RNA SPEC QL NAA+PROBE: NEGATIVE
GLOBULIN SER CALC-MCNC: 3.4 G/DL (ref 2.2–4)
GLUCOSE SERPL-MCNC: 79 MG/DL (ref 70–99)
HCT VFR BLD AUTO: 42.8 % (ref 37–53)
HGB BLD-MCNC: 14.6 G/DL (ref 13.5–17.5)
IMM GRANULOCYTES # BLD AUTO: 0.02 K/MM3 (ref 0–0.1)
IMM GRANULOCYTES NFR BLD AUTO: 0 % (ref 0–1)
LYMPHOCYTES # BLD AUTO: 2.92 K/MM3 (ref 0.84–5.2)
LYMPHOCYTES NFR BLD AUTO: 33 % (ref 21–46)
MCHC RBC AUTO-ENTMCNC: 34.1 G/DL (ref 31.5–36.5)
MCV RBC AUTO: 88 FL (ref 80–100)
MONOCYTES # BLD AUTO: 0.92 K/MM3 (ref 0.16–1.47)
MONOCYTES NFR BLD AUTO: 11 % (ref 4–13)
NEUTROPHILS # BLD AUTO: 4.61 K/MM3 (ref 1.96–9.15)
NEUTROPHILS NFR BLD AUTO: 53 % (ref 41–73)
NRBC # BLD AUTO: 0 K/MM3 (ref 0–0.02)
NRBC BLD AUTO-RTO: 0 /100 WBC (ref 0–0.2)
PLATELET # BLD AUTO: 241 K/MM3 (ref 150–400)
POTASSIUM SERPL-SCNC: 4.3 MMOL/L (ref 3.5–5.5)
PROT SERPL-MCNC: 7.3 G/DL (ref 6.4–8.2)
RSV RNA SPEC QL NAA+PROBE: NEGATIVE
SALICYLATES SERPL-MCNC: <1.7 MG/DL (ref 2.8–20)
SARS-COV-2 RNA RESP QL NAA+PROBE: NEGATIVE
SODIUM SERPL-SCNC: 138 MMOL/L (ref 136–145)
SP GR SPEC: 1 (ref 1–1.02)
UROBILINOGEN UR STRIP-MCNC: (no result) MG/DL

## 2024-10-13 PROCEDURE — A9270 NON-COVERED ITEM OR SERVICE: HCPCS

## 2024-10-13 PROCEDURE — G0480 DRUG TEST DEF 1-7 CLASSES: HCPCS

## 2024-10-13 PROCEDURE — G0378 HOSPITAL OBSERVATION PER HR: HCPCS

## 2024-10-14 VITALS — SYSTOLIC BLOOD PRESSURE: 120 MMHG | DIASTOLIC BLOOD PRESSURE: 79 MMHG

## 2024-10-21 ENCOUNTER — HOSPITAL ENCOUNTER (EMERGENCY)
Dept: HOSPITAL 95 - ER | Age: 49
Discharge: LEFT BEFORE DISCHARGE | End: 2024-10-21
Payer: COMMERCIAL

## 2024-10-21 VITALS — BODY MASS INDEX: 28.44 KG/M2 | WEIGHT: 209.99 LBS | HEIGHT: 72 IN

## 2024-10-21 VITALS — DIASTOLIC BLOOD PRESSURE: 79 MMHG | SYSTOLIC BLOOD PRESSURE: 121 MMHG

## 2024-10-21 DIAGNOSIS — Z53.29: ICD-10-CM

## 2024-10-21 DIAGNOSIS — F99: Primary | ICD-10-CM

## 2024-11-11 ENCOUNTER — HOSPITAL ENCOUNTER (OUTPATIENT)
Dept: HOSPITAL 95 - ER | Age: 49
Setting detail: OBSERVATION
LOS: 1 days | Discharge: HOME | End: 2024-11-12
Attending: EMERGENCY MEDICINE | Admitting: EMERGENCY MEDICINE
Payer: COMMERCIAL

## 2024-11-11 VITALS — WEIGHT: 209.99 LBS | HEIGHT: 72 IN | BODY MASS INDEX: 28.44 KG/M2

## 2024-11-11 DIAGNOSIS — F20.9: Primary | ICD-10-CM

## 2024-11-11 DIAGNOSIS — I25.2: ICD-10-CM

## 2024-11-11 DIAGNOSIS — Z87.891: ICD-10-CM

## 2024-11-11 DIAGNOSIS — E78.5: ICD-10-CM

## 2024-11-11 DIAGNOSIS — Z79.899: ICD-10-CM

## 2024-11-11 DIAGNOSIS — Z88.8: ICD-10-CM

## 2024-11-11 LAB
ALBUMIN SERPL BCP-MCNC: 3.7 G/DL (ref 3.4–5)
ALBUMIN/GLOB SERPL: 1.1 {RATIO} (ref 0.8–1.8)
ALT SERPL W P-5'-P-CCNC: 27 U/L (ref 12–78)
ANION GAP SERPL CALCULATED.4IONS-SCNC: 8 MMOL/L (ref 3–11)
APAP SERPL-MCNC: <2 UG/ML (ref 10–30)
AST SERPL W P-5'-P-CCNC: 24 U/L (ref 12–37)
BASOPHILS # BLD AUTO: 0.02 K/MM3 (ref 0–0.23)
BASOPHILS NFR BLD AUTO: 0 % (ref 0–2)
BILIRUB SERPL-MCNC: 0.5 MG/DL (ref 0.1–1)
BUN SERPL-MCNC: 19 MG/DL (ref 8–24)
CALCIUM SERPL-MCNC: 8.8 MG/DL (ref 8.5–10.1)
CHLORIDE SERPL-SCNC: 106 MMOL/L (ref 98–108)
CO2 SERPL-SCNC: 30 MMOL/L (ref 21–32)
CREAT SERPL-MCNC: 1.19 MG/DL (ref 0.6–1.2)
DEPRECATED RDW RBC AUTO: 43.6 FL (ref 35.1–46.3)
EOSINOPHIL # BLD AUTO: 0.11 K/MM3 (ref 0–0.68)
EOSINOPHIL NFR BLD AUTO: 1 % (ref 0–6)
ERYTHROCYTE [DISTWIDTH] IN BLOOD BY AUTOMATED COUNT: 13.3 % (ref 11.7–14.2)
ETHANOL SERPL-MCNC: <3 MG/DL
GLOBULIN SER CALC-MCNC: 3.3 G/DL (ref 2.2–4)
GLUCOSE SERPL-MCNC: 82 MG/DL (ref 70–99)
HCT VFR BLD AUTO: 43.7 % (ref 37–53)
HGB BLD-MCNC: 14.7 G/DL (ref 13.5–17.5)
IMM GRANULOCYTES # BLD AUTO: 0.02 K/MM3 (ref 0–0.1)
IMM GRANULOCYTES NFR BLD AUTO: 0 % (ref 0–1)
LYMPHOCYTES # BLD AUTO: 2.31 K/MM3 (ref 0.84–5.2)
LYMPHOCYTES NFR BLD AUTO: 28 % (ref 21–46)
MCHC RBC AUTO-ENTMCNC: 33.6 G/DL (ref 31.5–36.5)
MCV RBC AUTO: 89 FL (ref 80–100)
MONOCYTES # BLD AUTO: 0.75 K/MM3 (ref 0.16–1.47)
MONOCYTES NFR BLD AUTO: 9 % (ref 4–13)
NEUTROPHILS # BLD AUTO: 5.04 K/MM3 (ref 1.96–9.15)
NEUTROPHILS NFR BLD AUTO: 61 % (ref 41–73)
NRBC # BLD AUTO: 0 K/MM3 (ref 0–0.02)
NRBC BLD AUTO-RTO: 0 /100 WBC (ref 0–0.2)
PLATELET # BLD AUTO: 228 K/MM3 (ref 150–400)
POTASSIUM SERPL-SCNC: 4.3 MMOL/L (ref 3.5–5.5)
PROT SERPL-MCNC: 7 G/DL (ref 6.4–8.2)
SALICYLATES SERPL-MCNC: <1.7 MG/DL (ref 2.8–20)
SODIUM SERPL-SCNC: 140 MMOL/L (ref 136–145)
SP GR SPEC: 1.01 (ref 1–1.02)
UROBILINOGEN UR STRIP-MCNC: (no result) MG/DL

## 2024-11-11 PROCEDURE — G0378 HOSPITAL OBSERVATION PER HR: HCPCS

## 2024-11-11 PROCEDURE — G0480 DRUG TEST DEF 1-7 CLASSES: HCPCS

## 2024-11-11 PROCEDURE — A9270 NON-COVERED ITEM OR SERVICE: HCPCS

## 2024-11-12 VITALS — SYSTOLIC BLOOD PRESSURE: 115 MMHG | DIASTOLIC BLOOD PRESSURE: 78 MMHG

## 2024-12-28 ENCOUNTER — HOSPITAL ENCOUNTER (EMERGENCY)
Dept: HOSPITAL 95 - ER | Age: 49
Discharge: HOME | End: 2024-12-28
Payer: COMMERCIAL

## 2024-12-28 VITALS — HEIGHT: 72 IN | BODY MASS INDEX: 28.44 KG/M2 | WEIGHT: 209.99 LBS

## 2024-12-28 VITALS — SYSTOLIC BLOOD PRESSURE: 111 MMHG | DIASTOLIC BLOOD PRESSURE: 68 MMHG

## 2024-12-28 DIAGNOSIS — Z79.899: ICD-10-CM

## 2024-12-28 DIAGNOSIS — Z87.891: ICD-10-CM

## 2024-12-28 DIAGNOSIS — E78.5: ICD-10-CM

## 2024-12-28 DIAGNOSIS — F41.9: ICD-10-CM

## 2024-12-28 DIAGNOSIS — F25.9: ICD-10-CM

## 2024-12-28 DIAGNOSIS — G47.00: Primary | ICD-10-CM

## 2024-12-28 DIAGNOSIS — I25.2: ICD-10-CM

## 2024-12-28 DIAGNOSIS — M10.9: ICD-10-CM

## 2024-12-28 DIAGNOSIS — Z88.8: ICD-10-CM

## 2024-12-28 LAB
ALBUMIN SERPL BCP-MCNC: 3.8 G/DL (ref 3.4–5)
ALBUMIN/GLOB SERPL: 1.2 {RATIO} (ref 0.8–1.8)
ALT SERPL W P-5'-P-CCNC: 24 U/L (ref 12–78)
ANION GAP SERPL CALCULATED.4IONS-SCNC: 9 MMOL/L (ref 3–11)
APAP SERPL-MCNC: <2 UG/ML (ref 10–30)
AST SERPL W P-5'-P-CCNC: 20 U/L (ref 12–37)
BASOPHILS # BLD AUTO: 0.03 K/MM3 (ref 0–0.23)
BASOPHILS NFR BLD AUTO: 0 % (ref 0–2)
BILIRUB SERPL-MCNC: 0.4 MG/DL (ref 0.1–1)
BUN SERPL-MCNC: 23 MG/DL (ref 8–24)
CALCIUM SERPL-MCNC: 8.9 MG/DL (ref 8.5–10.1)
CHLORIDE SERPL-SCNC: 106 MMOL/L (ref 98–108)
CO2 SERPL-SCNC: 28 MMOL/L (ref 21–32)
CREAT SERPL-MCNC: 1.2 MG/DL (ref 0.6–1.2)
DEPRECATED RDW RBC AUTO: 43.5 FL (ref 35.1–46.3)
EOSINOPHIL # BLD AUTO: 0.11 K/MM3 (ref 0–0.68)
EOSINOPHIL NFR BLD AUTO: 1 % (ref 0–6)
ERYTHROCYTE [DISTWIDTH] IN BLOOD BY AUTOMATED COUNT: 13.4 % (ref 11.7–14.2)
ETHANOL SERPL-MCNC: <3 MG/DL
GLOBULIN SER CALC-MCNC: 3.2 G/DL (ref 2.2–4)
GLUCOSE SERPL-MCNC: 92 MG/DL (ref 70–99)
HCT VFR BLD AUTO: 42.5 % (ref 37–53)
HGB BLD-MCNC: 14.2 G/DL (ref 13.5–17.5)
IMM GRANULOCYTES # BLD AUTO: 0.01 K/MM3 (ref 0–0.1)
IMM GRANULOCYTES NFR BLD AUTO: 0 % (ref 0–1)
LYMPHOCYTES # BLD AUTO: 2.85 K/MM3 (ref 0.84–5.2)
LYMPHOCYTES NFR BLD AUTO: 34 % (ref 21–46)
MCHC RBC AUTO-ENTMCNC: 33.4 G/DL (ref 31.5–36.5)
MCV RBC AUTO: 88 FL (ref 80–100)
MONOCYTES # BLD AUTO: 0.76 K/MM3 (ref 0.16–1.47)
MONOCYTES NFR BLD AUTO: 9 % (ref 4–13)
NEUTROPHILS # BLD AUTO: 4.57 K/MM3 (ref 1.96–9.15)
NEUTROPHILS NFR BLD AUTO: 55 % (ref 41–73)
NRBC # BLD AUTO: 0 K/MM3 (ref 0–0.02)
NRBC BLD AUTO-RTO: 0 /100 WBC (ref 0–0.2)
PLATELET # BLD AUTO: 264 K/MM3 (ref 150–400)
POTASSIUM SERPL-SCNC: 4.1 MMOL/L (ref 3.5–5.5)
PROT SERPL-MCNC: 7 G/DL (ref 6.4–8.2)
SALICYLATES SERPL-MCNC: <1.7 MG/DL (ref 2.8–20)
SODIUM SERPL-SCNC: 139 MMOL/L (ref 136–145)
SP GR SPEC: 1.01 (ref 1–1.02)
UROBILINOGEN UR STRIP-MCNC: (no result) MG/DL

## 2024-12-28 PROCEDURE — A9270 NON-COVERED ITEM OR SERVICE: HCPCS

## 2024-12-28 PROCEDURE — G0480 DRUG TEST DEF 1-7 CLASSES: HCPCS

## 2025-03-11 ENCOUNTER — HOSPITAL ENCOUNTER (EMERGENCY)
Dept: HOSPITAL 95 - ER | Age: 50
Discharge: HOME | End: 2025-03-11
Payer: COMMERCIAL

## 2025-03-11 VITALS — BODY MASS INDEX: 31.15 KG/M2 | WEIGHT: 230.01 LBS | HEIGHT: 72 IN

## 2025-03-11 VITALS — SYSTOLIC BLOOD PRESSURE: 130 MMHG | DIASTOLIC BLOOD PRESSURE: 85 MMHG

## 2025-03-11 DIAGNOSIS — R44.0: Primary | ICD-10-CM

## 2025-03-11 DIAGNOSIS — Z79.01: ICD-10-CM

## 2025-03-11 DIAGNOSIS — Z79.899: ICD-10-CM

## 2025-03-11 DIAGNOSIS — Z87.891: ICD-10-CM

## 2025-03-11 DIAGNOSIS — K21.9: ICD-10-CM

## 2025-03-11 DIAGNOSIS — Z86.59: ICD-10-CM

## 2025-03-11 DIAGNOSIS — Z88.8: ICD-10-CM

## 2025-03-11 DIAGNOSIS — R79.89: ICD-10-CM

## 2025-03-11 DIAGNOSIS — E78.5: ICD-10-CM

## 2025-03-11 LAB
ALBUMIN SERPL BCP-MCNC: 3.9 G/DL (ref 3.4–5)
ALBUMIN/GLOB SERPL: 1 {RATIO} (ref 0.8–1.8)
ALT SERPL W P-5'-P-CCNC: 36 U/L (ref 12–78)
ANION GAP SERPL CALCULATED.4IONS-SCNC: 9 MMOL/L (ref 3–11)
APAP SERPL-MCNC: <2 UG/ML (ref 10–30)
AST SERPL W P-5'-P-CCNC: 30 U/L (ref 12–37)
BASOPHILS # BLD AUTO: 0.03 K/MM3 (ref 0–0.23)
BASOPHILS NFR BLD AUTO: 0 % (ref 0–2)
BILIRUB SERPL-MCNC: 0.4 MG/DL (ref 0.1–1)
BUN SERPL-MCNC: 39 MG/DL (ref 8–24)
CALCIUM SERPL-MCNC: 8.9 MG/DL (ref 8.5–10.1)
CHLORIDE SERPL-SCNC: 104 MMOL/L (ref 98–108)
CO2 SERPL-SCNC: 28 MMOL/L (ref 21–32)
CREAT SERPL-MCNC: 1.97 MG/DL (ref 0.6–1.2)
DEPRECATED RDW RBC AUTO: 44.6 FL (ref 35.1–46.3)
EOSINOPHIL # BLD AUTO: 0.11 K/MM3 (ref 0–0.68)
EOSINOPHIL NFR BLD AUTO: 1 % (ref 0–6)
ERYTHROCYTE [DISTWIDTH] IN BLOOD BY AUTOMATED COUNT: 13.7 % (ref 11.7–14.2)
ETHANOL SERPL-MCNC: <3 MG/DL
GLOBULIN SER CALC-MCNC: 3.8 G/DL (ref 2.2–4)
GLUCOSE SERPL-MCNC: 93 MG/DL (ref 70–99)
HCT VFR BLD AUTO: 44.6 % (ref 37–53)
HGB BLD-MCNC: 15.2 G/DL (ref 13.5–17.5)
IMM GRANULOCYTES # BLD AUTO: 0.03 K/MM3 (ref 0–0.1)
IMM GRANULOCYTES NFR BLD AUTO: 0 % (ref 0–1)
LYMPHOCYTES # BLD AUTO: 2.47 K/MM3 (ref 0.84–5.2)
LYMPHOCYTES NFR BLD AUTO: 27 % (ref 21–46)
MCHC RBC AUTO-ENTMCNC: 34.1 G/DL (ref 31.5–36.5)
MCV RBC AUTO: 89 FL (ref 80–100)
MONOCYTES # BLD AUTO: 0.8 K/MM3 (ref 0.16–1.47)
MONOCYTES NFR BLD AUTO: 9 % (ref 4–13)
NEUTROPHILS # BLD AUTO: 5.84 K/MM3 (ref 1.96–9.15)
NEUTROPHILS NFR BLD AUTO: 63 % (ref 41–73)
NRBC # BLD AUTO: 0 K/MM3 (ref 0–0.02)
NRBC BLD AUTO-RTO: 0 /100 WBC (ref 0–0.2)
PLATELET # BLD AUTO: 230 K/MM3 (ref 150–400)
POTASSIUM SERPL-SCNC: 4.2 MMOL/L (ref 3.5–5.5)
PROT SERPL-MCNC: 7.7 G/DL (ref 6.4–8.2)
SALICYLATES SERPL-MCNC: <1.7 MG/DL (ref 2.8–20)
SODIUM SERPL-SCNC: 137 MMOL/L (ref 136–145)
SP GR SPEC: 1.01 (ref 1–1.02)
TSH SERPL DL<=0.005 MIU/L-ACNC: 1.31 UIU/ML (ref 0.36–4.8)
UROBILINOGEN UR STRIP-MCNC: (no result) MG/DL

## 2025-03-11 PROCEDURE — G0481 DRUG TEST DEF 8-14 CLASSES: HCPCS

## 2025-03-11 PROCEDURE — G0480 DRUG TEST DEF 1-7 CLASSES: HCPCS

## 2025-03-14 ENCOUNTER — HOSPITAL ENCOUNTER (EMERGENCY)
Dept: HOSPITAL 95 - ER | Age: 50
Discharge: HOME | End: 2025-03-14
Payer: COMMERCIAL

## 2025-03-14 VITALS — WEIGHT: 230.01 LBS | HEIGHT: 72 IN | BODY MASS INDEX: 31.15 KG/M2

## 2025-03-14 VITALS — DIASTOLIC BLOOD PRESSURE: 92 MMHG | SYSTOLIC BLOOD PRESSURE: 147 MMHG

## 2025-03-14 DIAGNOSIS — Z88.5: ICD-10-CM

## 2025-03-14 DIAGNOSIS — Z88.8: ICD-10-CM

## 2025-03-14 DIAGNOSIS — F25.9: Primary | ICD-10-CM

## 2025-03-14 DIAGNOSIS — Z87.891: ICD-10-CM

## 2025-03-14 DIAGNOSIS — I25.2: ICD-10-CM

## 2025-03-14 DIAGNOSIS — Z79.899: ICD-10-CM

## 2025-03-14 DIAGNOSIS — G47.00: ICD-10-CM

## 2025-03-14 DIAGNOSIS — E78.5: ICD-10-CM

## 2025-03-16 LAB
AMITRIP UR-MCNC: <100 NG/ML
CLOMIPRAMINE UR-MCNC: <200 NG/ML
DESIPRAMINE UR-MCNC: <100 NG/ML
DOXEPIN UR-MCNC: <100 NG/ML
IMIPRAMINE UR-MCNC: <100 NG/ML
NORCLOMIPRAMINE UR-MCNC: <200 NG/ML
NORDOXEPIN UR-MCNC: <100 NG/ML
NORTRIP UR-MCNC: <100 NG/ML
PROTRIP UR-MCNC: <100 NG/ML

## 2025-03-25 ENCOUNTER — HOSPITAL ENCOUNTER (OUTPATIENT)
Dept: HOSPITAL 95 - ER | Age: 50
Setting detail: OBSERVATION
Discharge: OTHER INSTITUTION | End: 2025-03-25
Attending: EMERGENCY MEDICINE | Admitting: EMERGENCY MEDICINE
Payer: COMMERCIAL

## 2025-03-25 ENCOUNTER — HOSPITAL ENCOUNTER (INPATIENT)
Dept: HOSPITAL 95 - BHU | Age: 50
LOS: 3 days | Discharge: HOME | DRG: 885 | End: 2025-03-28
Attending: PSYCHIATRY & NEUROLOGY | Admitting: PSYCHIATRY & NEUROLOGY
Payer: COMMERCIAL

## 2025-03-25 VITALS — BODY MASS INDEX: 31.15 KG/M2 | WEIGHT: 230.01 LBS | HEIGHT: 72 IN

## 2025-03-25 VITALS — HEIGHT: 72 IN | BODY MASS INDEX: 30.82 KG/M2 | WEIGHT: 227.52 LBS

## 2025-03-25 VITALS — SYSTOLIC BLOOD PRESSURE: 133 MMHG | DIASTOLIC BLOOD PRESSURE: 76 MMHG

## 2025-03-25 VITALS — DIASTOLIC BLOOD PRESSURE: 90 MMHG | SYSTOLIC BLOOD PRESSURE: 109 MMHG

## 2025-03-25 VITALS — SYSTOLIC BLOOD PRESSURE: 109 MMHG | DIASTOLIC BLOOD PRESSURE: 90 MMHG

## 2025-03-25 DIAGNOSIS — E78.5: ICD-10-CM

## 2025-03-25 DIAGNOSIS — M10.9: ICD-10-CM

## 2025-03-25 DIAGNOSIS — Z88.8: ICD-10-CM

## 2025-03-25 DIAGNOSIS — Z79.899: ICD-10-CM

## 2025-03-25 DIAGNOSIS — G47.00: ICD-10-CM

## 2025-03-25 DIAGNOSIS — I25.2: ICD-10-CM

## 2025-03-25 DIAGNOSIS — F20.0: Primary | ICD-10-CM

## 2025-03-25 DIAGNOSIS — Z87.19: ICD-10-CM

## 2025-03-25 DIAGNOSIS — F17.200: ICD-10-CM

## 2025-03-25 LAB
ALBUMIN SERPL BCP-MCNC: 3.8 G/DL (ref 3.4–5)
ALBUMIN/GLOB SERPL: 1.1 {RATIO} (ref 0.8–1.8)
ALT SERPL W P-5'-P-CCNC: 41 U/L (ref 12–78)
ANION GAP SERPL CALCULATED.4IONS-SCNC: 7 MMOL/L (ref 3–11)
APAP SERPL-MCNC: <2 UG/ML (ref 10–30)
AST SERPL W P-5'-P-CCNC: 34 U/L (ref 12–37)
BASOPHILS # BLD AUTO: 0.02 K/MM3 (ref 0–0.23)
BASOPHILS NFR BLD AUTO: 0 % (ref 0–2)
BILIRUB SERPL-MCNC: 0.6 MG/DL (ref 0.1–1)
BUN SERPL-MCNC: 31 MG/DL (ref 8–24)
CALCIUM SERPL-MCNC: 8.7 MG/DL (ref 8.5–10.1)
CHLORIDE SERPL-SCNC: 102 MMOL/L (ref 98–108)
CO2 SERPL-SCNC: 30 MMOL/L (ref 21–32)
CREAT SERPL-MCNC: 1.81 MG/DL (ref 0.6–1.2)
DEPRECATED RDW RBC AUTO: 45.2 FL (ref 35.1–46.3)
EOSINOPHIL # BLD AUTO: 0.14 K/MM3 (ref 0–0.68)
EOSINOPHIL NFR BLD AUTO: 1 % (ref 0–6)
ERYTHROCYTE [DISTWIDTH] IN BLOOD BY AUTOMATED COUNT: 13.8 % (ref 11.7–14.2)
ETHANOL SERPL-MCNC: <3 MG/DL
GLOBULIN SER CALC-MCNC: 3.6 G/DL (ref 2.2–4)
GLUCOSE SERPL-MCNC: 94 MG/DL (ref 70–99)
HCT VFR BLD AUTO: 44.5 % (ref 37–53)
HGB BLD-MCNC: 15.1 G/DL (ref 13.5–17.5)
IMM GRANULOCYTES # BLD AUTO: 0.03 K/MM3 (ref 0–0.1)
IMM GRANULOCYTES NFR BLD AUTO: 0 % (ref 0–1)
LYMPHOCYTES # BLD AUTO: 2.52 K/MM3 (ref 0.84–5.2)
LYMPHOCYTES NFR BLD AUTO: 26 % (ref 21–46)
MCHC RBC AUTO-ENTMCNC: 33.9 G/DL (ref 31.5–36.5)
MCV RBC AUTO: 89 FL (ref 80–100)
MONOCYTES # BLD AUTO: 0.8 K/MM3 (ref 0.16–1.47)
MONOCYTES NFR BLD AUTO: 8 % (ref 4–13)
NEUTROPHILS # BLD AUTO: 6.15 K/MM3 (ref 1.96–9.15)
NEUTROPHILS NFR BLD AUTO: 64 % (ref 41–73)
NRBC # BLD AUTO: 0 K/MM3 (ref 0–0.02)
NRBC BLD AUTO-RTO: 0 /100 WBC (ref 0–0.2)
PLATELET # BLD AUTO: 235 K/MM3 (ref 150–400)
POTASSIUM SERPL-SCNC: 4.2 MMOL/L (ref 3.5–5.5)
PROT SERPL-MCNC: 7.4 G/DL (ref 6.4–8.2)
SALICYLATES SERPL-MCNC: <1.7 MG/DL (ref 2.8–20)
SODIUM SERPL-SCNC: 135 MMOL/L (ref 136–145)
SP GR SPEC: 1.01 (ref 1–1.02)
UROBILINOGEN UR STRIP-MCNC: (no result) MG/DL

## 2025-03-25 PROCEDURE — G0378 HOSPITAL OBSERVATION PER HR: HCPCS

## 2025-03-25 PROCEDURE — A9270 NON-COVERED ITEM OR SERVICE: HCPCS

## 2025-03-25 PROCEDURE — G0480 DRUG TEST DEF 1-7 CLASSES: HCPCS

## 2025-03-25 NOTE — NUR
ADMISSION NOTE:
PATIENT ARRIVED ON THE U AT 1820, AT THE END OF DAY SHIFT. DAY SHIFT MHA AND
RNS ASSESSED SKIN AND RECORDED BELONGINGS. THIS RN DID THE REST OF THE
ASSESSMENT. PATIENT IS AWARE OF REASONS WHY HE IS IN U. HE IS EAGER TO LEARN
ABOUT WAYS TO HELP HIMSELF. HE LIVES ALONE, WITH FATHER AND MOTHER NEARBY AND
HELPFUL, SUPPORTIVE. HE IS HAVING AH "TELLING ME TO DO THINGS". HE RECENTLY
CHANGED FROM LATUDA TO RISPERDAL, AND WANTS OVERSIGHT WHILE HE HAS RISPERDAL
ADJUSTED. HE IS COMPLAINING OF LOWER RIGHT BACK PAIN, "IT IS A CHARLEY HORSE
PUT THERE BY THE IMPLANT IN MY BRAIN TO SEE HOW I HANDLE THE PAIN. IT MAKES ME
REALLY ANGRY WHEN THEY DO THINGS LIKE THIS." HE RATES THE PAIN AT 2/10, AND
STATES HE WILL "DO YOGA" TO CONTROL IT, BECAUSE "IT'S A HALLUCINATION". HE C/O
"AUDIO HALLUCINATIONS, MEDICATION EVALUATION (NEEDED), MEMORY PROBLEMS,
COGNITIVE DYSFUNCTION, THOUGHT BLOCKING AND BRAIN FOG." HE REQUESTS A YOGA
MAT, WHICH HAS BEEN DEFERRED TO OT. HE STATES THAT HE SLEEPS "WELL, BUT ONLY
ABOUT 20-30 MINUTES OF DEEP SLEEP AND 80-90 MINUTES OF REM RIGHT NOW". HIS
PLAN FOR THIS EVENING IS TO TAKE HIS MEDICATIONS AND TRY TO SLEEP. HE DENIES
ANY THOUGHTS OF SUICIDAL IDEATION OR SELF HARMING. HE STATES THAT HE "WON'T
FEEL THAT WAY" BUT IF HE DOES, HE KNOWS TO TALK TO STAFF AND "I FEEL
COMFORTABLE DOING THAT." CONTINUING TO MONITOR FOR SAFETY WITH Q15 MINUTE
CHECKS.

## 2025-03-26 VITALS — DIASTOLIC BLOOD PRESSURE: 81 MMHG | SYSTOLIC BLOOD PRESSURE: 110 MMHG

## 2025-03-26 VITALS — SYSTOLIC BLOOD PRESSURE: 151 MMHG | DIASTOLIC BLOOD PRESSURE: 79 MMHG

## 2025-03-26 NOTE — NUR
SHIFT SUMMARY:
PATIENT ARRIVED ON UNIT AT 1820. PLEASE SEE ADMISSION NOTE. HE PARTICIPATED IN
THE END OF SNACK TIME, THEN WATCHED PART OF A MOVIE WITH PEERS AND STAFF. HIS
ORDERS FOR MEDICATION TOOK A BIT OF TIME. HE SHOWED PATIENCE WHILE WAITING,
ALTHOUGH HE STATED "IT'S FRUSTRATING". ONCE HIS MEDICATION WAS IN THE SYSTEM,
HE TOOK HIS RISPERDAL AND PRN AMBIEN, TRAZODONE AND MELATONIN. HE THEN WENT TO
HIS RO0M WHERE HE WAS NOTED TO BE IN BED RESTING WITH EYES CLOSED AND
RESPIRATIONS CONFIRMED. HE CONTINUED TO HAVE NO S/SX SUICIDAL IDEATION OR SELF
HARM. HE ASKED FOR EAR PLUGS BUT DID NOT HAVE OTHER NEEDS OR CONCERNS. HE
FOCUSED ON SELECT FEMALE STAFF AND TRIED TO ISOLATE HER FROM OTHERS, BUT STAFF
CONTINUED TO MEET HIS NEEDS WITHOUT ALLOWING THAT TO TAKE PLACE. HE WOKE UP AT
VARIOUS TIMES DURING THE SHIFT, BUT DID NOT COME OUT OF HIS ROOM. CONTINUING
TO MONITOR FOR SAFETY WITH Q15 MINUTE CHECKS.

## 2025-03-26 NOTE — NUR
SHIFT SUMMARY
PT A/O X4; PLEASANT AND COOPERATIVE WITH CARE. HE DENIES SI AND NO CURRENT HI.
HE REPORTS HI ON AND OFF FOR THE LAST FEW MONTHS THAT HAVE BEEN CAUSING HIM
INSOMNIA. PT DENIES WANTING TO HURT ANYONE SPECIFICALLY WHEN HE IS FEELING
HOMICIDAL. HE REPORTS CHRONIC AUDITORY HALLUCINATIONS BUT SAYS ONE VOICE IN
PARTICULAR VOICE BOTHERSOME. PT DENIES NEED FOR PRN MEDICATION FOR
HALLUCINATIONS. HE REPORTS THAT HE IS ABLE TO MANAGE THEM WELL AND HAS SUPPORT
GROUPS OUT PATIENT. PT ATTENDED MOST GROUPS BUT LEFT EARLY FOR A FEW. PT PACES
THE HALLS OFTEN AND MADE PHONE CALLS TO PCP.

## 2025-03-27 VITALS — DIASTOLIC BLOOD PRESSURE: 66 MMHG | SYSTOLIC BLOOD PRESSURE: 106 MMHG

## 2025-03-27 VITALS — SYSTOLIC BLOOD PRESSURE: 113 MMHG | DIASTOLIC BLOOD PRESSURE: 70 MMHG

## 2025-03-27 NOTE — NUR
PT HAS ATTENDED GROUPS AND BEEN ACTIVE IN THE PT MILIEU. HE HAS BEEN
COOPERATIVE WITH CARE. PT IS NOW WALKING IN THE HALLS.

## 2025-03-27 NOTE — NUR
MORNING UPDATE: 0440
PATIENT WAS UP STATING, "I HAVE HAD SOME SLEEP BUT IT ISN'T THE REAL KIND, THE
DREAMING KIND. YOU KNOW, THE REM SLEEP. I NEED TO HAVE SOME OF THAT. I WANT A
VISTARIL AND THE AMBIEN YOU OFFERED ME BEFORE." IT WAS EXPLAINED TO PATIENT
THAT THE AMBIEN HAD BEEN RETURNED, AND THAT IT WAS TOO LATE IN THE NIGHT TO
GIVE IT, AS IT WOULD LIKELY MAKE HIM TOO SLEEPY TO PARTICIPATE IN GROUPS
DURING THE DAY. HE AGREED TO TRY THE VISTARIL. HE TOOK IT AND WENT BACK TO BED
TO "REST AND HOPEFULLY SLEEP". CONTINUING TO MONITOR.

## 2025-03-27 NOTE — NUR
SHIFT ASSESSMENT: PT DENIED SI, HI AND PAIN. HE ENDORSED AUDITORY
HALLUCINATIONS, "THIS MORNING IT'S 3 GUYS AND A WOMAN, IT'S JUST NOISE IN THE
BACKGROUND. PT WANTS TO TALK TO THE DIRECTOR ABOUT HAVING A SLEEP MONITOR ON
HIS ARM AT NIGHT SO HE CAN SEE HOW MUCH HE IS SLEEPING. HE WAS INFORMED THAT
IT IS NOT ALLOWED ON THE UNIT AND THAT HE CAN WEAR ONE AFTER DISCHARGE IF HE
DESIRES. HE REMAINS INSISTANT ON TALKING WITH THE DIRECTOR ON THIS RULING. HE
REPORTED, "I HAVE A LITTLE BIT OF ANXIETY." HE DESCRIBED HIS MOOD AS,
"OPTIMISTIC."

## 2025-03-27 NOTE — NUR
SHIFT SUMMARY:
PATIENT WAS PACING IN THE HALLWAY AT THE BEGINNING OF THE SHIFT. HE APPROACHED
RN TO TALK ABOUT HIS FEELINGS AND CONCERNS. HE WAS ENCOURAGED TO JOURNAL FOR
THE PSYCHIATRIST DAILY VISIT, TO REMEMBER HIS THOUGHTS. HE CONTINUED TO
APPROACH THE NURSING STATION AND REPORT HIS THOUGHTS AND ANY PHYSICAL ISSUES.
HE WAS NOT ABLE TO PINPOINT A CONCERN, BUT RATHER HAD FLIGHT OF IDEAS THAT
WERE DIFFICULT TO FOLLOW. RN AGAIN ENCOURAGED HIM TO ORGANIZE HIS THOUGHTS BY
JOURNALING, WHICH HE STATED HE PLANNED TO DO. HE WATCHED THE MOVIE IN THE DAY
ROOM FOR A TIME, BUT MOSTLY WAS TALKING TO SELECT MALE STAFF. HE PARTICIPATED
IN SNACK AT 2000, AGAIN ENGAGING WITH SELECT STAFF. HE WAS COMPLIANT WITH HIS
EVENING MEDICATIONS AND ASKED PERTINENT QUESTIONS. HE REFUSED HIS AMBIEN,
BECAUSE HIS UNDERSTANDING WAS THAT THE DOCTOR HAD DISCONTINUED IT. HE WENT TO
BED AND ALTHOUGH HE WAS AWAKE AT TIMES, BY 0430 WAS RESTING QUIETLY WITH EYES
CLOSED AND RESPIRATIONS CONFIRMED FOR THE MOST PART. HE DENIED ANY THOUGHTS OF
SUICIDAL IDEATION OR SELF HARM. HE ADMITTED TO "HOMICIDAL FEELINGS" NOT
DIRECTED TOWARD ANY PERSON, BUT STATED THEY "AREN'T HAPPENING RIGHT NOW". HE
ASKED, "CAN YOU CALL THE DOCTOR AND HAVE THEM TURN OFF MY BRAIN PROGRAM THEY
IMPLANTED SO I CAN HAVE A BREAK?" HE WAS AGAIN ENCOURAGED TO WRITE HIS
CONCERNS DOWN TO ASK THE DOCTOR ABOUT THEM DIRECTLY. CONTINUING TO MONITOR FOR
SAFETY WITH Q15 MINUTE CHECKS.

## 2025-03-28 VITALS — DIASTOLIC BLOOD PRESSURE: 68 MMHG | SYSTOLIC BLOOD PRESSURE: 119 MMHG

## 2025-03-28 NOTE — NUR
DISCHARGE NOTE:
PT PROVIDED WITH DISCHARGE INSTRUCTIONS.  PT STATED UNDERSTANDING OF DISHARGE
INSTRUCITONS AND DENIED QUESTIONS.  PT BELONGINGS RETURNED BY FAUSTO ANGUIANO.  PT
AMBULATED OUT OF UNIT WITHOUT DIFFICULTY.  DISCHARGE INSTRUCTIONS AND
BELONGINGS IN HAND.

## 2025-03-28 NOTE — NUR
SHIFT SUMMARY:
PATIENT WAS IN THE HALLWAY AT THE BEGINNING OF THE SHIFT. HE CAME TO THE
NURSING STATION AND BEGAN TO YELL AT STAFF REGARDING HIS CARE. HE WAS UPSET
THAT HE IS BEING "FORCED TO LEAVE WHEN YOU HAVEN'T CHECKED MY SLEEP YET". HE
STATED THAT "THEY ARE INTRUDING IN MY BRAIN" AND "I NEED TO PUSH THEM AWAY".
HE WAS ASKED TO STATE HIS NEEDS IN A QUIETER TONE, AND THEN HE STATED, "YOU
MIGHT JUST HAVE TO TIE ME DOWN AND THROW ME IN THAT BACK ROOM." HE WAS
EVENTUALLY ABLE TO CALM SELF AND STATED, "I WILL JUST GO TO MY ROOM AND DO
YOGA." HE WENT CALMLY TO HIS ROOM AND ENGAGED IN YOGA LIKE EXERCISES FOR A
TIME. HE REFUSED OFFER OF SNACK AT 2000. HE WAS GIVEN PRN VISTARIL WITH HIS
NIGHTTIME MEDICATIONS AT HIS REQUEST. HE WENT TO BED AND WAS NOTED TO BE
RESTING QUIETLY WITH EYES CLOSED AND RESPIRATIONS CONFIRMED FOR THE REMAINDER
OF THE SHIFT. CONTINUING TO MONITOR FOR SAFETY WITH Q15 MINUTE CHECKS.

## 2025-03-28 NOTE — NUR
**IMPORTANT PATIENT INFORMATION**
 
ZABRINA HAS A [facility] UP [facility]POINT[facility] ON SATURDAY 04/05/25 AT
10AM. [facility].
 
PHARMACY: [facility] [phone]
 
[facility] TO PICK ZABRINA UP ON 03/28/25 AT 1PM

## 2025-04-05 ENCOUNTER — HOSPITAL ENCOUNTER (OUTPATIENT)
Dept: HOSPITAL 95 - LAB SHORT | Age: 50
End: 2025-04-05
Attending: NURSE PRACTITIONER
Payer: COMMERCIAL

## 2025-04-05 DIAGNOSIS — N17.9: Primary | ICD-10-CM

## 2025-04-05 LAB
CALCIUM SERPL-MCNC: 8.7 MG/DL (ref 8.5–10.1)
CREAT SERPL-MCNC: 1.39 MG/DL (ref 0.6–1.2)
POTASSIUM SERPL-SCNC: 4.3 MMOL/L (ref 3.5–5.5)

## 2025-06-04 ENCOUNTER — HOSPITAL ENCOUNTER (EMERGENCY)
Dept: HOSPITAL 95 - ER | Age: 50
Discharge: HOME | End: 2025-06-04
Payer: COMMERCIAL

## 2025-06-04 VITALS — HEIGHT: 72 IN | BODY MASS INDEX: 29.8 KG/M2 | WEIGHT: 220 LBS

## 2025-06-04 VITALS — DIASTOLIC BLOOD PRESSURE: 78 MMHG | SYSTOLIC BLOOD PRESSURE: 137 MMHG

## 2025-06-04 DIAGNOSIS — Z88.8: ICD-10-CM

## 2025-06-04 DIAGNOSIS — I25.2: ICD-10-CM

## 2025-06-04 DIAGNOSIS — Z79.899: ICD-10-CM

## 2025-06-04 DIAGNOSIS — E78.5: ICD-10-CM

## 2025-06-04 DIAGNOSIS — N18.2: ICD-10-CM

## 2025-06-04 DIAGNOSIS — M10.9: ICD-10-CM

## 2025-06-04 DIAGNOSIS — F17.200: ICD-10-CM

## 2025-06-04 DIAGNOSIS — L02.611: Primary | ICD-10-CM

## 2025-06-04 LAB
ANION GAP SERPL CALCULATED.4IONS-SCNC: 7 MMOL/L (ref 3–11)
BASOPHILS # BLD AUTO: 0.03 K/MM3 (ref 0–0.23)
BASOPHILS # BLD: (no result) K/MM3 (ref 0–0.23)
BASOPHILS NFR BLD AUTO: 0 % (ref 0–2)
BASOPHILS NFR BLD: (no result) % (ref 0–2)
BLASTS NFR BLD MANUAL: (no result) % (ref 0–0)
BUN SERPL-MCNC: 23 MG/DL (ref 8–24)
BUN/CREAT SERPL: 12.8 % (ref 12–20)
CALCIUM SERPL-MCNC: 8.7 MG/DL (ref 8.5–10.1)
CHLORIDE SERPL-SCNC: 105 MMOL/L (ref 98–108)
CO2 SERPL-SCNC: 29 MMOL/L (ref 21–32)
CREAT SERPL-MCNC: 1.79 MG/DL (ref 0.6–1.2)
DEPRECATED RDW RBC AUTO: 46.4 FL (ref 35.1–46.3)
EOSINOPHIL # BLD AUTO: 0.13 K/MM3 (ref 0–0.68)
EOSINOPHIL # BLD: (no result) K/MM3 (ref 0–0.68)
EOSINOPHIL NFR BLD AUTO: 2 % (ref 0–6)
EOSINOPHIL NFR BLD: (no result) % (ref 0–6)
ERYTHROCYTE [DISTWIDTH] IN BLOOD BY AUTOMATED COUNT: 14.2 % (ref 11.7–14.2)
GFR SERPL CREATININE-BSD FRML MDRD: 46 ML/MIN/{1.73_M2} (ref 60–?)
GLUCOSE SERPL-MCNC: 91 MG/DL (ref 70–99)
HCT VFR BLD AUTO: 41.7 % (ref 37–53)
HGB BLD-MCNC: 13.9 G/DL (ref 13.5–17.5)
IMM GRANULOCYTES # BLD AUTO: 0.02 K/MM3 (ref 0–0.1)
IMM GRANULOCYTES NFR BLD AUTO: 0 % (ref 0–1)
LYMPHOCYTES # BLD AUTO: 2.47 K/MM3 (ref 0.84–5.2)
LYMPHOCYTES # BLD: (no result) K/MM3 (ref 0.84–5.2)
LYMPHOCYTES NFR BLD AUTO: 29 % (ref 21–46)
LYMPHOCYTES NFR BLD: (no result) % (ref 21–46)
MCH RBC QN AUTO: 29.9 PG (ref 26–34)
MCHC RBC AUTO-ENTMCNC: 33.3 G/DL (ref 31.5–36.5)
MCV RBC AUTO: 90 FL (ref 80–100)
METAMYELOCYTES # BLD MANUAL: (no result) K/MM3 (ref 0–0)
METAMYELOCYTES NFR BLD MANUAL: (no result) % (ref 0–0)
MONOCYTES # BLD AUTO: 0.73 K/MM3 (ref 0.16–1.47)
MONOCYTES # BLD: (no result) K/MM3 (ref 0.16–1.47)
MONOCYTES NFR BLD AUTO: 9 % (ref 4–13)
MONOCYTES NFR BLD: (no result) % (ref 4–13)
MYELOCYTES # BLD MANUAL: (no result) K/MM3 (ref 0–0)
MYELOCYTES NFR BLD MANUAL: (no result) % (ref 0–0)
NEUTROPHILS # BLD AUTO: 5.08 K/MM3 (ref 1.96–9.15)
NEUTROPHILS NFR BLD AUTO: 60 % (ref 41–73)
NEUTS BAND NFR BLD MANUAL: (no result) % (ref 0–8)
NEUTS SEG # BLD MANUAL: (no result) K/MM3 (ref 1.96–9.15)
NEUTS SEG NFR BLD MANUAL: (no result) % (ref 41–73)
NRBC # BLD AUTO: 0 K/MM3 (ref 0–0.02)
NRBC BLD AUTO-RTO: 0 /100 WBC (ref 0–0.2)
PLASMA CELLS # BLD MANUAL: (no result) K/MM3 (ref 0–0)
PLASMA CELLS NFR BLD: (no result) % (ref 0–0)
PLATELET # BLD AUTO: 262 K/MM3 (ref 150–400)
PMV BLD AUTO: 11.4 FL (ref 9.1–12.4)
POTASSIUM SERPL-SCNC: 4.1 MMOL/L (ref 3.5–5.5)
PROMYELOCYTES # BLD MANUAL: (no result) K/MM3 (ref 0–0)
PROMYELOCYTES NFR BLD MANUAL: (no result) % (ref 0–0)
RBC # BLD AUTO: 4.65 M/MM3 (ref 4.3–5.9)
SODIUM SERPL-SCNC: 137 MMOL/L (ref 136–145)
TOTAL CELLS COUNTED BLD: (no result)
VARIANT LYMPHS NFR BLD MANUAL: (no result) % (ref 0–0)
WBC # BLD AUTO: 8.46 K/MM3 (ref 4–11.3)
WBC OTHER NFR BLD MANUAL: (no result) % (ref 0–0)

## 2025-06-29 ENCOUNTER — HOSPITAL ENCOUNTER (EMERGENCY)
Dept: HOSPITAL 95 - ER | Age: 50
Discharge: HOME | End: 2025-06-29
Payer: COMMERCIAL

## 2025-06-29 VITALS — BODY MASS INDEX: 29.8 KG/M2 | HEIGHT: 72 IN | WEIGHT: 220 LBS

## 2025-06-29 VITALS — DIASTOLIC BLOOD PRESSURE: 93 MMHG | SYSTOLIC BLOOD PRESSURE: 120 MMHG

## 2025-06-29 DIAGNOSIS — Z79.899: ICD-10-CM

## 2025-06-29 DIAGNOSIS — E78.00: ICD-10-CM

## 2025-06-29 DIAGNOSIS — F25.9: Primary | ICD-10-CM

## 2025-06-30 ENCOUNTER — HOSPITAL ENCOUNTER (EMERGENCY)
Dept: HOSPITAL 95 - ER | Age: 50
Discharge: HOME | End: 2025-06-30
Payer: COMMERCIAL

## 2025-06-30 VITALS — SYSTOLIC BLOOD PRESSURE: 121 MMHG | DIASTOLIC BLOOD PRESSURE: 77 MMHG

## 2025-06-30 VITALS — WEIGHT: 220 LBS | HEIGHT: 72 IN | BODY MASS INDEX: 29.8 KG/M2

## 2025-06-30 DIAGNOSIS — E78.5: ICD-10-CM

## 2025-06-30 DIAGNOSIS — F25.9: Primary | ICD-10-CM

## 2025-06-30 DIAGNOSIS — I25.2: ICD-10-CM

## 2025-06-30 DIAGNOSIS — Z79.899: ICD-10-CM

## 2025-06-30 DIAGNOSIS — F17.200: ICD-10-CM

## 2025-06-30 DIAGNOSIS — Z88.8: ICD-10-CM

## 2025-06-30 LAB
ALBUMIN SERPL BCP-MCNC: 3.9 G/DL (ref 3.4–5)
ALBUMIN/GLOB SERPL: 1.1 {RATIO} (ref 0.8–1.8)
ALP SERPL-CCNC: 97 U/L (ref 50–136)
ALT SERPL W P-5'-P-CCNC: 47 U/L (ref 12–78)
AMPHETAMINES UR SCN-MCNC: NOT DETECTED NG/ML
AMPHETAMINES UR-MCNC: NOT DETECTED UG/L
ANION GAP SERPL CALCULATED.4IONS-SCNC: 7 MMOL/L (ref 3–11)
APAP SERPL-MCNC: <2 UG/ML (ref 10–30)
APPEARANCE, URINE: CLEAR
AST SERPL W P-5'-P-CCNC: 32 U/L (ref 12–37)
BARBITURATES UR SCN-MCNC: NOT DETECTED NG/ML
BASOPHILS # BLD AUTO: 0.02 K/MM3 (ref 0–0.23)
BASOPHILS # BLD: (no result) K/MM3 (ref 0–0.23)
BASOPHILS NFR BLD AUTO: 0 % (ref 0–2)
BASOPHILS NFR BLD: (no result) % (ref 0–2)
BENZODIAZ UR-MCNC: NOT DETECTED UG/L
BILIRUB SERPL-MCNC: 0.6 MG/DL (ref 0.1–1)
BILIRUB UR QL STRIP: (no result)
BLASTS NFR BLD MANUAL: (no result) % (ref 0–0)
BUN SERPL-MCNC: 21 MG/DL (ref 8–24)
BUN/CREAT SERPL: 18.3 % (ref 12–20)
BUPRENORPHINE UR-MCNC: NOT DETECTED NG/ML
CALCIUM SERPL-MCNC: 8.4 MG/DL (ref 8.5–10.1)
CANNABINOIDS UR QL: NOT DETECTED
CHLORIDE SERPL-SCNC: 103 MMOL/L (ref 98–108)
CO2 SERPL-SCNC: 29 MMOL/L (ref 21–32)
COCAINE UR-MCNC: NOT DETECTED NG/ML
COLOR SPEC: (no result)
CREAT SERPL-MCNC: 1.15 MG/DL (ref 0.6–1.2)
DEPRECATED RDW RBC AUTO: 46.4 FL (ref 35.1–46.3)
EOSINOPHIL # BLD AUTO: 0.11 K/MM3 (ref 0–0.68)
EOSINOPHIL # BLD: (no result) K/MM3 (ref 0–0.68)
EOSINOPHIL NFR BLD AUTO: 2 % (ref 0–6)
EOSINOPHIL NFR BLD: (no result) % (ref 0–6)
ERYTHROCYTE [DISTWIDTH] IN BLOOD BY AUTOMATED COUNT: 14.4 % (ref 11.7–14.2)
ETHANOL SERPL-MCNC: <3 MG/DL
GFR SERPL CREATININE-BSD FRML MDRD: 78 ML/MIN/{1.73_M2} (ref 60–?)
GLOBULIN SER CALC-MCNC: 3.5 G/DL (ref 2.2–4)
GLUCOSE SERPL-MCNC: 89 MG/DL (ref 70–99)
GLUCOSE UR-MCNC: (no result) MG/DL
HCT VFR BLD AUTO: 42.9 % (ref 37–53)
HGB BLD-MCNC: 14.2 G/DL (ref 13.5–17.5)
IMM GRANULOCYTES # BLD AUTO: 0.01 K/MM3 (ref 0–0.1)
IMM GRANULOCYTES NFR BLD AUTO: 0 % (ref 0–1)
KETONES UR STRIP-MCNC: (no result) MG/DL
LEUKOCYTE ESTERASE UR QL STRIP: (no result)
LYMPHOCYTES # BLD AUTO: 2.03 K/MM3 (ref 0.84–5.2)
LYMPHOCYTES # BLD: (no result) K/MM3 (ref 0.84–5.2)
LYMPHOCYTES NFR BLD AUTO: 28 % (ref 21–46)
LYMPHOCYTES NFR BLD: (no result) % (ref 21–46)
MCH RBC QN AUTO: 29 PG (ref 26–34)
MCHC RBC AUTO-ENTMCNC: 33.1 G/DL (ref 31.5–36.5)
MCV RBC AUTO: 88 FL (ref 80–100)
METAMYELOCYTES # BLD MANUAL: (no result) K/MM3 (ref 0–0)
METAMYELOCYTES NFR BLD MANUAL: (no result) % (ref 0–0)
METHADONE UR-MCNC: NOT DETECTED UG/L
MONOCYTES # BLD AUTO: 0.79 K/MM3 (ref 0.16–1.47)
MONOCYTES # BLD: (no result) K/MM3 (ref 0.16–1.47)
MONOCYTES NFR BLD AUTO: 11 % (ref 4–13)
MONOCYTES NFR BLD: (no result) % (ref 4–13)
MYELOCYTES # BLD MANUAL: (no result) K/MM3 (ref 0–0)
MYELOCYTES NFR BLD MANUAL: (no result) % (ref 0–0)
NEUTROPHILS # BLD AUTO: 4.42 K/MM3 (ref 1.96–9.15)
NEUTROPHILS NFR BLD AUTO: 60 % (ref 41–73)
NEUTS BAND NFR BLD MANUAL: (no result) % (ref 0–8)
NEUTS SEG # BLD MANUAL: (no result) K/MM3 (ref 1.96–9.15)
NEUTS SEG NFR BLD MANUAL: (no result) % (ref 41–73)
NITRITE UR QL STRIP: (no result)
NRBC # BLD AUTO: 0 K/MM3 (ref 0–0.02)
NRBC BLD AUTO-RTO: 0 /100 WBC (ref 0–0.2)
OPIATES UR-MCNC: NOT DETECTED NG/ML
OXYCODONE UR-MCNC: NOT DETECTED NG/ML
PCP UR-MCNC: NOT DETECTED UG/L
PH UR STRIP: 6 [PH] (ref 5–8)
PLASMA CELLS # BLD MANUAL: (no result) K/MM3 (ref 0–0)
PLASMA CELLS NFR BLD: (no result) % (ref 0–0)
PLATELET # BLD AUTO: 241 K/MM3 (ref 150–400)
PMV BLD AUTO: 10.9 FL (ref 9.1–12.4)
POTASSIUM SERPL-SCNC: 4.1 MMOL/L (ref 3.5–5.5)
PROMYELOCYTES # BLD MANUAL: (no result) K/MM3 (ref 0–0)
PROMYELOCYTES NFR BLD MANUAL: (no result) % (ref 0–0)
PROT SERPL-MCNC: 7.4 G/DL (ref 6.4–8.2)
PROT UR STRIP-MCNC: (no result) MG/DL
RBC # BLD AUTO: 4.89 M/MM3 (ref 4.3–5.9)
RBC # UR STRIP: (no result) /UL
SALICYLATES SERPL-MCNC: <1.7 MG/DL (ref 2.8–20)
SODIUM SERPL-SCNC: 135 MMOL/L (ref 136–145)
SP GR SPEC: 1.01 (ref 1–1.02)
TOTAL CELLS COUNTED BLD: (no result)
URN SPEC COLLECT METH UR: (no result)
UROBILINOGEN UR STRIP-MCNC: (no result) MG/DL
VARIANT LYMPHS NFR BLD MANUAL: (no result) % (ref 0–0)
WBC # BLD AUTO: 7.38 K/MM3 (ref 4–11.3)
WBC OTHER NFR BLD MANUAL: (no result) % (ref 0–0)

## 2025-06-30 PROCEDURE — G0480 DRUG TEST DEF 1-7 CLASSES: HCPCS

## 2025-07-07 ENCOUNTER — HOSPITAL ENCOUNTER (INPATIENT)
Dept: HOSPITAL 95 - BHU | Age: 50
LOS: 4 days | Discharge: HOME | DRG: 885 | End: 2025-07-11
Attending: PSYCHIATRY & NEUROLOGY | Admitting: PSYCHIATRY & NEUROLOGY
Payer: COMMERCIAL

## 2025-07-07 ENCOUNTER — HOSPITAL ENCOUNTER (OUTPATIENT)
Dept: HOSPITAL 95 - ER | Age: 50
Setting detail: OBSERVATION
Discharge: OTHER INSTITUTION | End: 2025-07-07
Attending: EMERGENCY MEDICINE | Admitting: EMERGENCY MEDICINE
Payer: COMMERCIAL

## 2025-07-07 VITALS — HEIGHT: 72 IN | WEIGHT: 211.42 LBS | BODY MASS INDEX: 28.64 KG/M2

## 2025-07-07 VITALS — SYSTOLIC BLOOD PRESSURE: 123 MMHG | DIASTOLIC BLOOD PRESSURE: 79 MMHG

## 2025-07-07 VITALS — BODY MASS INDEX: 29.8 KG/M2 | WEIGHT: 220 LBS | HEIGHT: 72 IN

## 2025-07-07 VITALS — SYSTOLIC BLOOD PRESSURE: 116 MMHG | DIASTOLIC BLOOD PRESSURE: 67 MMHG

## 2025-07-07 VITALS — DIASTOLIC BLOOD PRESSURE: 67 MMHG | SYSTOLIC BLOOD PRESSURE: 116 MMHG

## 2025-07-07 DIAGNOSIS — I25.2: ICD-10-CM

## 2025-07-07 DIAGNOSIS — Z87.891: ICD-10-CM

## 2025-07-07 DIAGNOSIS — F20.9: Primary | ICD-10-CM

## 2025-07-07 DIAGNOSIS — E78.5: ICD-10-CM

## 2025-07-07 DIAGNOSIS — M10.9: ICD-10-CM

## 2025-07-07 DIAGNOSIS — Z88.8: ICD-10-CM

## 2025-07-07 DIAGNOSIS — Z79.899: ICD-10-CM

## 2025-07-07 LAB
ALBUMIN SERPL BCP-MCNC: 4.1 G/DL (ref 3.4–5)
ALBUMIN/GLOB SERPL: 1.2 {RATIO} (ref 0.8–1.8)
ALP SERPL-CCNC: 96 U/L (ref 50–136)
ALT SERPL W P-5'-P-CCNC: 44 U/L (ref 12–78)
AMPHETAMINES UR SCN-MCNC: NOT DETECTED NG/ML
AMPHETAMINES UR-MCNC: NOT DETECTED UG/L
ANION GAP SERPL CALCULATED.4IONS-SCNC: 4 MMOL/L (ref 3–11)
APAP SERPL-MCNC: <2 UG/ML (ref 10–30)
APPEARANCE, URINE: CLEAR
AST SERPL W P-5'-P-CCNC: 34 U/L (ref 12–37)
BARBITURATES UR SCN-MCNC: NOT DETECTED NG/ML
BASOPHILS # BLD AUTO: 0.02 K/MM3 (ref 0–0.23)
BASOPHILS # BLD: (no result) K/MM3 (ref 0–0.23)
BASOPHILS NFR BLD AUTO: 0 % (ref 0–2)
BASOPHILS NFR BLD: (no result) % (ref 0–2)
BENZODIAZ UR-MCNC: NOT DETECTED UG/L
BILIRUB SERPL-MCNC: 0.6 MG/DL (ref 0.1–1)
BILIRUB UR QL STRIP: (no result)
BLASTS NFR BLD MANUAL: (no result) % (ref 0–0)
BUN SERPL-MCNC: 19 MG/DL (ref 8–24)
BUN/CREAT SERPL: 18.1 % (ref 12–20)
BUPRENORPHINE UR-MCNC: NOT DETECTED NG/ML
CALCIUM SERPL-MCNC: 9 MG/DL (ref 8.5–10.1)
CANNABINOIDS UR QL: NOT DETECTED
CHLORIDE SERPL-SCNC: 103 MMOL/L (ref 98–108)
CO2 SERPL-SCNC: 32 MMOL/L (ref 21–32)
COCAINE UR-MCNC: NOT DETECTED NG/ML
COLOR SPEC: (no result)
CREAT SERPL-MCNC: 1.05 MG/DL (ref 0.6–1.2)
DEPRECATED RDW RBC AUTO: 46.5 FL (ref 35.1–46.3)
EOSINOPHIL # BLD AUTO: 0.1 K/MM3 (ref 0–0.68)
EOSINOPHIL # BLD: (no result) K/MM3 (ref 0–0.68)
EOSINOPHIL NFR BLD AUTO: 1 % (ref 0–6)
EOSINOPHIL NFR BLD: (no result) % (ref 0–6)
ERYTHROCYTE [DISTWIDTH] IN BLOOD BY AUTOMATED COUNT: 14.3 % (ref 11.7–14.2)
ETHANOL SERPL-MCNC: <3 MG/DL
GFR SERPL CREATININE-BSD FRML MDRD: 87 ML/MIN/{1.73_M2} (ref 60–?)
GLOBULIN SER CALC-MCNC: 3.3 G/DL (ref 2.2–4)
GLUCOSE SERPL-MCNC: 80 MG/DL (ref 70–99)
GLUCOSE UR-MCNC: (no result) MG/DL
HCT VFR BLD AUTO: 43.6 % (ref 37–53)
HGB BLD-MCNC: 14.6 G/DL (ref 13.5–17.5)
IMM GRANULOCYTES # BLD AUTO: 0.01 K/MM3 (ref 0–0.1)
IMM GRANULOCYTES NFR BLD AUTO: 0 % (ref 0–1)
KETONES UR STRIP-MCNC: (no result) MG/DL
LEUKOCYTE ESTERASE UR QL STRIP: (no result)
LYMPHOCYTES # BLD AUTO: 2.28 K/MM3 (ref 0.84–5.2)
LYMPHOCYTES # BLD: (no result) K/MM3 (ref 0.84–5.2)
LYMPHOCYTES NFR BLD AUTO: 26 % (ref 21–46)
LYMPHOCYTES NFR BLD: (no result) % (ref 21–46)
MCH RBC QN AUTO: 29.7 PG (ref 26–34)
MCHC RBC AUTO-ENTMCNC: 33.5 G/DL (ref 31.5–36.5)
MCV RBC AUTO: 89 FL (ref 80–100)
METAMYELOCYTES # BLD MANUAL: (no result) K/MM3 (ref 0–0)
METAMYELOCYTES NFR BLD MANUAL: (no result) % (ref 0–0)
METHADONE UR-MCNC: NOT DETECTED UG/L
MONOCYTES # BLD AUTO: 0.85 K/MM3 (ref 0.16–1.47)
MONOCYTES # BLD: (no result) K/MM3 (ref 0.16–1.47)
MONOCYTES NFR BLD AUTO: 10 % (ref 4–13)
MONOCYTES NFR BLD: (no result) % (ref 4–13)
MYELOCYTES # BLD MANUAL: (no result) K/MM3 (ref 0–0)
MYELOCYTES NFR BLD MANUAL: (no result) % (ref 0–0)
NEUTROPHILS # BLD AUTO: 5.64 K/MM3 (ref 1.96–9.15)
NEUTROPHILS NFR BLD AUTO: 63 % (ref 41–73)
NEUTS BAND NFR BLD MANUAL: (no result) % (ref 0–8)
NEUTS SEG # BLD MANUAL: (no result) K/MM3 (ref 1.96–9.15)
NEUTS SEG NFR BLD MANUAL: (no result) % (ref 41–73)
NITRITE UR QL STRIP: (no result)
NRBC # BLD AUTO: 0 K/MM3 (ref 0–0.02)
NRBC BLD AUTO-RTO: 0 /100 WBC (ref 0–0.2)
OPIATES UR-MCNC: NOT DETECTED NG/ML
OXYCODONE UR-MCNC: NOT DETECTED NG/ML
PCP UR-MCNC: NOT DETECTED UG/L
PH UR STRIP: 6 [PH] (ref 5–8)
PLASMA CELLS # BLD MANUAL: (no result) K/MM3 (ref 0–0)
PLASMA CELLS NFR BLD: (no result) % (ref 0–0)
PLATELET # BLD AUTO: 238 K/MM3 (ref 150–400)
PMV BLD AUTO: 10.2 FL (ref 9.1–12.4)
POTASSIUM SERPL-SCNC: 3.9 MMOL/L (ref 3.5–5.5)
PROMYELOCYTES # BLD MANUAL: (no result) K/MM3 (ref 0–0)
PROMYELOCYTES NFR BLD MANUAL: (no result) % (ref 0–0)
PROT SERPL-MCNC: 7.4 G/DL (ref 6.4–8.2)
PROT UR STRIP-MCNC: (no result) MG/DL
RBC # BLD AUTO: 4.92 M/MM3 (ref 4.3–5.9)
RBC # UR STRIP: (no result) /UL
SALICYLATES SERPL-MCNC: <1.7 MG/DL (ref 2.8–20)
SODIUM SERPL-SCNC: 135 MMOL/L (ref 136–145)
SP GR SPEC: 1 (ref 1–1.02)
TOTAL CELLS COUNTED BLD: (no result)
URN SPEC COLLECT METH UR: (no result)
UROBILINOGEN UR STRIP-MCNC: (no result) MG/DL
VARIANT LYMPHS NFR BLD MANUAL: (no result) % (ref 0–0)
WBC # BLD AUTO: 8.9 K/MM3 (ref 4–11.3)
WBC OTHER NFR BLD MANUAL: (no result) % (ref 0–0)

## 2025-07-07 PROCEDURE — G0480 DRUG TEST DEF 1-7 CLASSES: HCPCS

## 2025-07-07 PROCEDURE — A9270 NON-COVERED ITEM OR SERVICE: HCPCS

## 2025-07-08 VITALS — DIASTOLIC BLOOD PRESSURE: 77 MMHG | SYSTOLIC BLOOD PRESSURE: 112 MMHG

## 2025-07-08 LAB
CHOLEST SERPL-MCNC: 152 MG/DL (ref 50–200)
CHOLEST/HDLC SERPL: 2.1 {RATIO}
HDLC SERPL-MCNC: 72 MG/DL (ref 39–?)
LDLC SERPL CALC-MCNC: 71 MG/DL (ref 0–110)
LDLC/HDLC SERPL: 1 {RATIO}
TRIGL SERPL-MCNC: 47 MG/DL (ref 30–160)
VLDLC SERPL CALC-MCNC: 9 MG/DL (ref 6–32)

## 2025-07-09 VITALS — DIASTOLIC BLOOD PRESSURE: 68 MMHG | SYSTOLIC BLOOD PRESSURE: 119 MMHG

## 2025-07-09 VITALS — SYSTOLIC BLOOD PRESSURE: 114 MMHG | DIASTOLIC BLOOD PRESSURE: 72 MMHG

## 2025-07-10 VITALS — SYSTOLIC BLOOD PRESSURE: 103 MMHG | DIASTOLIC BLOOD PRESSURE: 66 MMHG

## 2025-07-10 VITALS — SYSTOLIC BLOOD PRESSURE: 112 MMHG | DIASTOLIC BLOOD PRESSURE: 80 MMHG

## 2025-07-11 VITALS — DIASTOLIC BLOOD PRESSURE: 77 MMHG | SYSTOLIC BLOOD PRESSURE: 108 MMHG

## 2025-07-30 ENCOUNTER — HOSPITAL ENCOUNTER (EMERGENCY)
Dept: HOSPITAL 95 - ER | Age: 50
Discharge: HOME | End: 2025-07-30
Payer: COMMERCIAL

## 2025-07-30 VITALS — DIASTOLIC BLOOD PRESSURE: 72 MMHG | SYSTOLIC BLOOD PRESSURE: 109 MMHG

## 2025-07-30 VITALS — WEIGHT: 200 LBS | BODY MASS INDEX: 25.67 KG/M2 | HEIGHT: 74 IN

## 2025-07-30 DIAGNOSIS — Z86.59: ICD-10-CM

## 2025-07-30 DIAGNOSIS — Z87.891: ICD-10-CM

## 2025-07-30 DIAGNOSIS — R44.0: Primary | ICD-10-CM

## 2025-07-30 DIAGNOSIS — Z79.899: ICD-10-CM

## 2025-07-30 DIAGNOSIS — Z88.8: ICD-10-CM
